# Patient Record
Sex: FEMALE | Race: WHITE | NOT HISPANIC OR LATINO | Employment: FULL TIME | URBAN - METROPOLITAN AREA
[De-identification: names, ages, dates, MRNs, and addresses within clinical notes are randomized per-mention and may not be internally consistent; named-entity substitution may affect disease eponyms.]

---

## 2017-05-15 ENCOUNTER — EMPLOYEE HEALTH (OUTPATIENT)
Dept: OCCUPATIONAL MEDICINE | Facility: CLINIC | Age: 22
End: 2017-05-15

## 2017-05-15 ENCOUNTER — EH NON-PROVIDER (OUTPATIENT)
Dept: OCCUPATIONAL MEDICINE | Facility: CLINIC | Age: 22
End: 2017-05-15

## 2017-05-15 ENCOUNTER — HOSPITAL ENCOUNTER (OUTPATIENT)
Facility: MEDICAL CENTER | Age: 22
End: 2017-05-15
Attending: PREVENTIVE MEDICINE
Payer: COMMERCIAL

## 2017-05-15 VITALS
OXYGEN SATURATION: 98 % | WEIGHT: 133 LBS | TEMPERATURE: 98.2 F | BODY MASS INDEX: 24.48 KG/M2 | RESPIRATION RATE: 16 BRPM | HEIGHT: 62 IN | SYSTOLIC BLOOD PRESSURE: 92 MMHG | DIASTOLIC BLOOD PRESSURE: 60 MMHG | HEART RATE: 62 BPM

## 2017-05-15 DIAGNOSIS — Z02.1 PRE-EMPLOYMENT HEALTH SCREENING EXAMINATION: ICD-10-CM

## 2017-05-15 DIAGNOSIS — Z02.1 PRE-EMPLOYMENT DRUG SCREENING: ICD-10-CM

## 2017-05-15 LAB
AMP AMPHETAMINE: NORMAL
BAR BARBITURATES: NORMAL
BZO BENZODIAZEPINES: NORMAL
COC COCAINE: NORMAL
INT CON NEG: NORMAL
INT CON POS: NORMAL
MDMA ECSTASY: NORMAL
MET METHAMPHETAMINES: NORMAL
MTD METHADONE: NORMAL
OPI OPIATES: NORMAL
OXY OXYCODONE: NORMAL
PCP PHENCYCLIDINE: NORMAL
POC URINE DRUG SCREEN OCDRS: NEGATIVE
THC: NORMAL

## 2017-05-15 PROCEDURE — 8915 PR COMPREHENSIVE PHYSICAL: Performed by: PREVENTIVE MEDICINE

## 2017-05-15 PROCEDURE — 86480 TB TEST CELL IMMUN MEASURE: CPT | Performed by: PREVENTIVE MEDICINE

## 2017-05-15 PROCEDURE — 94375 RESPIRATORY FLOW VOLUME LOOP: CPT | Performed by: PREVENTIVE MEDICINE

## 2017-05-15 PROCEDURE — 80305 DRUG TEST PRSMV DIR OPT OBS: CPT | Performed by: PREVENTIVE MEDICINE

## 2017-05-15 NOTE — PROGRESS NOTES
[employer] New Employee  1. Drug Screen COMPLETE  2. Immunizations  - Quantiferon  DRAW  - Varicella  immune  - MMR  doc  - Hep B  doc  - Tdap  doc   - Flu   doc  3. Mask Fit N95-S and CAPR  4. Physical Clearance complete

## 2017-05-15 NOTE — MR AVS SNAPSHOT
"Jenna Dover   5/15/2017 9:50 AM   Employee Health   MRN: [id number]    Department:  [facility]   Dept Phone:  [phone]    Description:  Female : 1995   Provider:  Otilio Machuca M.D.           Reason for Visit     Other rm 24 physical clearance      Allergies as of 5/15/2017     No Known Allergies      You were diagnosed with     Pre-employment health screening examination   [165522]         Vital Signs     Blood Pressure Pulse Temperature Respirations Height Weight    92/60 mmHg 62 36.8 °C (98.2 °F) 16 1.575 m (5' 2\") 60.328 kg (133 lb)    Body Mass Index Oxygen Saturation                24.32 kg/m2 98%          Basic Information     Date Of Birth Sex Race Ethnicity Preferred Language    1995 Female White Non- English      Health Maintenance     Patient has no pending health maintenance at this time      Current Immunizations     Hepatitis A Vaccine, Ped/Adol 2004    Hepatitis B Vaccine Non-Recombivax (Ped/Adol) 1996, 1995, 1995    MMR Vaccine 2000, 1996    Tdap Vaccine 2017    Varicella Vaccine Live 1996      Below and/or attached are the medications your provider expects you to take. Review all of your home medications and newly ordered medications with your provider and/or pharmacist. Follow medication instructions as directed by your provider and/or pharmacist. Please keep your medication list with you and share with your provider. Update the information when medications are discontinued, doses are changed, or new medications (including over-the-counter products) are added; and carry medication information at all times in the event of emergency situations     Allergies:  No Known Allergies          Medications  Valid as of: May 15, 2017 - 10:52 AM    Generic Name Brand Name Tablet Size Instructions for use    .                 Medicines prescribed today were sent to:     None      Medication refill instructions:       If your " prescription bottle indicates you have medication refills left, it is not necessary to call your provider’s office. Please contact your pharmacy and they will refill your medication.    If your prescription bottle indicates you do not have any refills left, you may request refills at any time through one of the following ways: The online [employer] system (except Urgent Care), by calling your provider’s office, or by asking your pharmacy to contact your provider’s office with a refill request. Medication refills are processed only during regular business hours and may not be available until the next business day. Your provider may request additional information or to have a follow-up visit with you prior to refilling your medication.   *Please Note: Medication refills are assigned a new Rx number when refilled electronically. Your pharmacy may indicate that no refills were authorized even though a new prescription for the same medication is available at the pharmacy. Please request the medicine by name with the pharmacy before contacting your provider for a refill.           [employer] Access Code: Activation code not generated  Current [employer] Status: Active

## 2017-05-15 NOTE — MR AVS SNAPSHOT
Jenna Dover   5/15/2017 9:10 AM    Non-Provider   MRN: [id number]    Department:  [facility]   Dept Phone:  [phone]    Description:  Female : 1995   Provider:  [facility], RYeisonN.           Reason for Visit     Other [facility] pre-employment clearance      Allergies as of 5/15/2017     No Known Allergies      You were diagnosed with     Pre-employment health screening examination   [948098]       Pre-employment drug screening   [762765]         Basic Information     Date Of Birth Sex Race Ethnicity Preferred Language    1995 Female White Non- English      Health Maintenance     Patient has no pending health maintenance at this time      Results     POCT 11 Panel Urine Drug Screen      Component    AMPHETAMINE    COCAINE    POC THC    METHAMPHETAMINES    OPIATES    PHENCYCLIDINE    BENZODIAZIPINES    BARBITURATES    METHADONE    MDMA Ecstasy    OXYCODONE    Urine Drug Screen    NEGATIVE    Internal Control Positive    Valid    Internal Control Negative    Valid                        Current Immunizations     Hepatitis A Vaccine, Ped/Adol 2004    Hepatitis B Vaccine Non-Recombivax (Ped/Adol) 1996, 1995, 1995    MMR Vaccine 2000, 1996    Tdap Vaccine 2017    Varicella Vaccine Live 1996      Below and/or attached are the medications your provider expects you to take. Review all of your home medications and newly ordered medications with your provider and/or pharmacist. Follow medication instructions as directed by your provider and/or pharmacist. Please keep your medication list with you and share with your provider. Update the information when medications are discontinued, doses are changed, or new medications (including over-the-counter products) are added; and carry medication information at all times in the event of emergency situations     Allergies:  No Known Allergies          Medications  Valid as of: May 15, 2017 - 10:31 AM    Generic Name Brand Name Tablet Size Instructions for use    .                 Medicines prescribed today were sent to:     None      Medication refill instructions:       If your prescription bottle indicates you have medication refills left, it is not necessary to call your provider’s office. Please contact your pharmacy and they will refill your medication.    If your prescription bottle indicates you do not have any refills left, you may request refills at any time through one of the following ways: The online [employer] system (except Urgent Care), by calling your provider’s office, or by asking your pharmacy to contact your provider’s office with a refill request. Medication refills are processed only during regular business hours and may not be available until the next business day. Your provider may request additional information or to have a follow-up visit with you prior to refilling your medication.   *Please Note: Medication refills are assigned a new Rx number when refilled electronically. Your pharmacy may indicate that no refills were authorized even though a new prescription for the same medication is available at the pharmacy. Please request the medicine by name with the pharmacy before contacting your provider for a refill.        Your To Do List     Future Labs/Procedures Complete By Expires    QuantiFERON-TB Gold [TB TEST CELL IMM MEASURE AG]  As directed 5/15/2018         [employer] Access Code: Activation code not generated  Current [employer] Status: Active

## 2017-05-17 LAB
M TB TUBERC IFN-G BLD QL: NEGATIVE
M TB TUBERC IFN-G/MITOGEN IGNF BLD: 0.14
M TB TUBERC IGNF/MITOGEN IGNF CONTROL: 66.62 [IU]/ML
MITOGEN IGNF BCKGRD COR BLD-ACNC: 0.06 [IU]/ML

## 2017-08-30 ENCOUNTER — HOSPITAL ENCOUNTER (OUTPATIENT)
Dept: LAB | Facility: MEDICAL CENTER | Age: 22
End: 2017-08-30
Attending: PHYSICIAN ASSISTANT
Payer: COMMERCIAL

## 2017-08-30 PROCEDURE — 87491 CHLMYD TRACH DNA AMP PROBE: CPT

## 2017-08-30 PROCEDURE — 88175 CYTOPATH C/V AUTO FLUID REDO: CPT

## 2017-08-30 PROCEDURE — 87591 N.GONORRHOEAE DNA AMP PROB: CPT

## 2017-09-02 LAB
C TRACH DNA GENITAL QL NAA+PROBE: NEGATIVE
CYTOLOGY REG CYTOL: NORMAL
N GONORRHOEA DNA GENITAL QL NAA+PROBE: NEGATIVE
SPECIMEN SOURCE: NORMAL

## 2017-09-12 ENCOUNTER — HOSPITAL ENCOUNTER (OUTPATIENT)
Facility: MEDICAL CENTER | Age: 22
End: 2017-09-12
Attending: PHYSICIAN ASSISTANT
Payer: COMMERCIAL

## 2017-09-12 ENCOUNTER — OFFICE VISIT (OUTPATIENT)
Dept: URGENT CARE | Facility: PHYSICIAN GROUP | Age: 22
End: 2017-09-12
Payer: COMMERCIAL

## 2017-09-12 VITALS
WEIGHT: 136.8 LBS | BODY MASS INDEX: 25.17 KG/M2 | SYSTOLIC BLOOD PRESSURE: 110 MMHG | TEMPERATURE: 98.2 F | HEIGHT: 62 IN | OXYGEN SATURATION: 98 % | DIASTOLIC BLOOD PRESSURE: 72 MMHG | HEART RATE: 74 BPM | RESPIRATION RATE: 16 BRPM

## 2017-09-12 DIAGNOSIS — N30.01 ACUTE CYSTITIS WITH HEMATURIA: ICD-10-CM

## 2017-09-12 DIAGNOSIS — R30.0 DYSURIA: ICD-10-CM

## 2017-09-12 DIAGNOSIS — R39.15 URINARY URGENCY: ICD-10-CM

## 2017-09-12 DIAGNOSIS — R35.0 URINARY FREQUENCY: ICD-10-CM

## 2017-09-12 LAB
APPEARANCE UR: NORMAL
BILIRUB UR STRIP-MCNC: NORMAL MG/DL
COLOR UR AUTO: YELLOW
GLUCOSE UR STRIP.AUTO-MCNC: NORMAL MG/DL
KETONES UR STRIP.AUTO-MCNC: NORMAL MG/DL
LEUKOCYTE ESTERASE UR QL STRIP.AUTO: NORMAL
NITRITE UR QL STRIP.AUTO: NORMAL
PH UR STRIP.AUTO: 6 [PH] (ref 5–8)
PROT UR QL STRIP: NORMAL MG/DL
RBC UR QL AUTO: NORMAL
SP GR UR STRIP.AUTO: 1
UROBILINOGEN UR STRIP-MCNC: NORMAL MG/DL

## 2017-09-12 PROCEDURE — 87077 CULTURE AEROBIC IDENTIFY: CPT

## 2017-09-12 PROCEDURE — 81002 URINALYSIS NONAUTO W/O SCOPE: CPT | Performed by: PHYSICIAN ASSISTANT

## 2017-09-12 PROCEDURE — 87186 SC STD MICRODIL/AGAR DIL: CPT

## 2017-09-12 PROCEDURE — 87086 URINE CULTURE/COLONY COUNT: CPT

## 2017-09-12 PROCEDURE — 99203 OFFICE O/P NEW LOW 30 MIN: CPT | Performed by: PHYSICIAN ASSISTANT

## 2017-09-12 RX ORDER — PHENAZOPYRIDINE HYDROCHLORIDE 200 MG/1
200 TABLET, FILM COATED ORAL 3 TIMES DAILY
Qty: 6 TAB | Refills: 0 | Status: SHIPPED | OUTPATIENT
Start: 2017-09-12 | End: 2017-09-14

## 2017-09-12 RX ORDER — NITROFURANTOIN 25; 75 MG/1; MG/1
100 CAPSULE ORAL EVERY 12 HOURS
Qty: 10 CAP | Refills: 0 | Status: SHIPPED | OUTPATIENT
Start: 2017-09-12 | End: 2017-09-17

## 2017-09-13 DIAGNOSIS — N30.01 ACUTE CYSTITIS WITH HEMATURIA: ICD-10-CM

## 2017-09-13 NOTE — PROGRESS NOTES
Chief Complaint   Patient presents with   • UTI     frequency, burning, odor, headaches       HISTORY OF PRESENT ILLNESS: Patient is a 22 y.o. female who presents today for the following:    Patient comes in for evaluation of acute onset dysuria. The symptoms started 2 days ago. She reports associated increased urinary frequency and urgency. She complains of suprapubic pressure. She denies flank pain, over abdominal pain, nausea, vomiting, and fever. She has not taken any of the counter medication but has been drinking a lot of water. This is her third UTI this year and has never had one before this year. She is sexually active and tries to urinate after intercourse. Her LMP was one week ago.    There are no active problems to display for this patient.      Allergies:Review of patient's allergies indicates no known allergies.    Current Outpatient Prescriptions Ordered in [facility]   Medication Sig Dispense Refill   • nitrofurantoin monohydr macro (MACROBID) 100 MG Cap Take 1 Cap by mouth every 12 hours for 5 days. 10 Cap 0   • phenazopyridine (PYRIDIUM) 200 MG Tab Take 1 Tab by mouth 3 times a day for 2 days. 6 Tab 0     No current Epic-ordered facility-administered medications on file.        No past medical history on file.    Social History   Substance Use Topics   • Smoking status: Never Smoker   • Smokeless tobacco: Never Used   • Alcohol use 0.6 oz/week     1 Shots of liquor per week       No family status information on file.   No family history on file.    ROS:   Review of Systems   Constitutional: Negative for fever, chills, weight loss and malaise/fatigue.   HENT: Negative for ear pain, nosebleeds, congestion, sore throat and neck pain.    Eyes: Negative for blurred vision.   Respiratory: Negative for cough, sputum production, shortness of breath and wheezing.    Cardiovascular: Negative for chest pain, palpitations, orthopnea and leg swelling.   Gastrointestinal: Negative for heartburn, nausea, vomiting and  "abdominal pain.   Genitourinary:Positive for dysuria, urgency and frequency.       Exam:  Blood pressure 110/72, pulse 74, temperature 36.8 °C (98.2 °F), resp. rate 16, height 1.575 m (5' 2\"), weight 62.1 kg (136 lb 12.8 oz), last menstrual period 09/05/2017, SpO2 98 %, not currently breastfeeding.  General: Well developed, well nourished. No distress.  HEENT: Head is grossly normal.  Pulmonary: Clear to ausculation and percussion.  Normal effort. No rales, ronchi, or wheezing.   Cardiovascular: Regular rate and rhythm without murmur. No edema.    Back: No CVA tenderness noted.  Abdomen: Soft, non-tender, nondistended. No hepatosplenomegaly.   Neurologic: Grossly nonfocal.  Skin: Warm, dry, good turgor. No rashes in visible areas.   Psych: Normal mood. Alert and oriented x3. Judgment and insight is normal.    UA: Large leukocytes, positive nitrates, hemolyzed blood, otherwise negative.    Assessment/Plan:  Drink plenty of fluids. Will contact patient culture results. Take all medication as directed.  1. Acute cystitis with hematuria  Urine Culture    nitrofurantoin monohydr macro (MACROBID) 100 MG Cap   2. Dysuria  POCT Urinalysis    phenazopyridine (PYRIDIUM) 200 MG Tab   3. Urinary frequency  POCT Urinalysis   4. Urinary urgency  POCT Urinalysis       "

## 2017-09-15 LAB
BACTERIA UR CULT: ABNORMAL
SIGNIFICANT IND 70042: ABNORMAL
SOURCE SOURCE: ABNORMAL

## 2017-09-18 ENCOUNTER — TELEPHONE (OUTPATIENT)
Dept: URGENT CARE | Facility: PHYSICIAN GROUP | Age: 22
End: 2017-09-18

## 2017-09-18 NOTE — TELEPHONE ENCOUNTER
----- Message from Zofia Coppola P.A.-C. sent at 9/15/2017 10:44 AM PDT -----  Please let pt know the urine culture is positive for infection. Patient was treated appropriately during the visit. Follow up for persistent symptoms.

## 2017-09-19 ENCOUNTER — TELEPHONE (OUTPATIENT)
Dept: URGENT CARE | Facility: PHYSICIAN GROUP | Age: 22
End: 2017-09-19

## 2017-09-19 NOTE — TELEPHONE ENCOUNTER
Phone Number Called: [phone] (home)     Message: left message for patient to call clinic for lab results    Left Message for patient to call back: yes

## 2017-09-21 ENCOUNTER — NON-PROVIDER VISIT (OUTPATIENT)
Dept: URGENT CARE | Facility: PHYSICIAN GROUP | Age: 22
End: 2017-09-21

## 2017-09-21 DIAGNOSIS — Z23 NEED FOR INFLUENZA VACCINATION: ICD-10-CM

## 2017-09-21 PROCEDURE — 90686 IIV4 VACC NO PRSV 0.5 ML IM: CPT | Performed by: PHYSICIAN ASSISTANT

## 2017-10-06 ENCOUNTER — OFFICE VISIT (OUTPATIENT)
Dept: URGENT CARE | Facility: PHYSICIAN GROUP | Age: 22
End: 2017-10-06
Payer: COMMERCIAL

## 2017-10-06 ENCOUNTER — HOSPITAL ENCOUNTER (OUTPATIENT)
Facility: MEDICAL CENTER | Age: 22
End: 2017-10-06
Attending: FAMILY MEDICINE
Payer: COMMERCIAL

## 2017-10-06 VITALS
BODY MASS INDEX: 24.92 KG/M2 | HEART RATE: 73 BPM | OXYGEN SATURATION: 99 % | RESPIRATION RATE: 14 BRPM | DIASTOLIC BLOOD PRESSURE: 72 MMHG | HEIGHT: 62 IN | WEIGHT: 135.4 LBS | SYSTOLIC BLOOD PRESSURE: 118 MMHG | TEMPERATURE: 97.8 F

## 2017-10-06 DIAGNOSIS — R53.83 OTHER FATIGUE: ICD-10-CM

## 2017-10-06 DIAGNOSIS — N89.8 VAGINAL DISCHARGE: ICD-10-CM

## 2017-10-06 LAB
ALBUMIN SERPL BCP-MCNC: 4.7 G/DL (ref 3.2–4.9)
ALBUMIN/GLOB SERPL: 1.5 G/DL
ALP SERPL-CCNC: 74 U/L (ref 30–99)
ALT SERPL-CCNC: 16 U/L (ref 2–50)
ANION GAP SERPL CALC-SCNC: 9 MMOL/L (ref 0–11.9)
APPEARANCE UR: CLEAR
AST SERPL-CCNC: 18 U/L (ref 12–45)
BASOPHILS # BLD AUTO: 0 % (ref 0–1.8)
BASOPHILS # BLD: 0 K/UL (ref 0–0.12)
BILIRUB SERPL-MCNC: 1.2 MG/DL (ref 0.1–1.5)
BILIRUB UR STRIP-MCNC: NORMAL MG/DL
BUN SERPL-MCNC: 8 MG/DL (ref 8–22)
CALCIUM SERPL-MCNC: 9.8 MG/DL (ref 8.5–10.5)
CHLORIDE SERPL-SCNC: 103 MMOL/L (ref 96–112)
CO2 SERPL-SCNC: 26 MMOL/L (ref 20–33)
COLOR UR AUTO: YELLOW
CREAT SERPL-MCNC: 0.7 MG/DL (ref 0.5–1.4)
EOSINOPHIL # BLD AUTO: 0.1 K/UL (ref 0–0.51)
EOSINOPHIL NFR BLD: 1.7 % (ref 0–6.9)
ERYTHROCYTE [DISTWIDTH] IN BLOOD BY AUTOMATED COUNT: 39.5 FL (ref 35.9–50)
GFR SERPL CREATININE-BSD FRML MDRD: >60 ML/MIN/1.73 M 2
GLOBULIN SER CALC-MCNC: 3.1 G/DL (ref 1.9–3.5)
GLUCOSE SERPL-MCNC: 86 MG/DL (ref 65–99)
GLUCOSE UR STRIP.AUTO-MCNC: NORMAL MG/DL
HCT VFR BLD AUTO: 40.1 % (ref 37–47)
HGB BLD-MCNC: 13.8 G/DL (ref 12–16)
INT CON NEG: NEGATIVE
INT CON POS: POSITIVE
KETONES UR STRIP.AUTO-MCNC: NORMAL MG/DL
LEUKOCYTE ESTERASE UR QL STRIP.AUTO: NORMAL
LYMPHOCYTES # BLD AUTO: 1.66 K/UL (ref 1–4.8)
LYMPHOCYTES NFR BLD: 28.1 % (ref 22–41)
MANUAL DIFF BLD: NORMAL
MCH RBC QN AUTO: 31.4 PG (ref 27–33)
MCHC RBC AUTO-ENTMCNC: 34.4 G/DL (ref 33.6–35)
MCV RBC AUTO: 91.3 FL (ref 81.4–97.8)
MONOCYTES # BLD AUTO: 0.47 K/UL (ref 0–0.85)
MONOCYTES NFR BLD AUTO: 7.9 % (ref 0–13.4)
MORPHOLOGY BLD-IMP: NORMAL
NEUTROPHILS # BLD AUTO: 3.68 K/UL (ref 2–7.15)
NEUTROPHILS NFR BLD: 62.3 % (ref 44–72)
NITRITE UR QL STRIP.AUTO: NORMAL
NRBC # BLD AUTO: 0 K/UL
NRBC BLD AUTO-RTO: 0 /100 WBC
PH UR STRIP.AUTO: 5 [PH] (ref 5–8)
PLATELET # BLD AUTO: 251 K/UL (ref 164–446)
PLATELET BLD QL SMEAR: NORMAL
PMV BLD AUTO: 11.1 FL (ref 9–12.9)
POC URINE PREGNANCY TEST: NEGATIVE
POTASSIUM SERPL-SCNC: 3.5 MMOL/L (ref 3.6–5.5)
PROT SERPL-MCNC: 7.8 G/DL (ref 6–8.2)
PROT UR QL STRIP: NORMAL MG/DL
RBC # BLD AUTO: 4.39 M/UL (ref 4.2–5.4)
RBC BLD AUTO: NORMAL
RBC UR QL AUTO: NORMAL
SODIUM SERPL-SCNC: 138 MMOL/L (ref 135–145)
SP GR UR STRIP.AUTO: 1.01
TSH SERPL DL<=0.005 MIU/L-ACNC: 1.17 UIU/ML (ref 0.3–3.7)
UROBILINOGEN UR STRIP-MCNC: NORMAL MG/DL
WBC # BLD AUTO: 5.9 K/UL (ref 4.8–10.8)

## 2017-10-06 PROCEDURE — 80053 COMPREHEN METABOLIC PANEL: CPT

## 2017-10-06 PROCEDURE — 99214 OFFICE O/P EST MOD 30 MIN: CPT | Performed by: FAMILY MEDICINE

## 2017-10-06 PROCEDURE — 81002 URINALYSIS NONAUTO W/O SCOPE: CPT | Performed by: FAMILY MEDICINE

## 2017-10-06 PROCEDURE — 87591 N.GONORRHOEAE DNA AMP PROB: CPT

## 2017-10-06 PROCEDURE — 85007 BL SMEAR W/DIFF WBC COUNT: CPT

## 2017-10-06 PROCEDURE — 81025 URINE PREGNANCY TEST: CPT | Performed by: FAMILY MEDICINE

## 2017-10-06 PROCEDURE — 87660 TRICHOMONAS VAGIN DIR PROBE: CPT

## 2017-10-06 PROCEDURE — 85027 COMPLETE CBC AUTOMATED: CPT

## 2017-10-06 PROCEDURE — 87480 CANDIDA DNA DIR PROBE: CPT

## 2017-10-06 PROCEDURE — 84443 ASSAY THYROID STIM HORMONE: CPT

## 2017-10-06 PROCEDURE — 87491 CHLMYD TRACH DNA AMP PROBE: CPT

## 2017-10-06 PROCEDURE — 87510 GARDNER VAG DNA DIR PROBE: CPT

## 2017-10-06 PROCEDURE — 87086 URINE CULTURE/COLONY COUNT: CPT

## 2017-10-06 RX ORDER — FLUCONAZOLE 150 MG/1
150 TABLET ORAL
Qty: 2 TAB | Refills: 0 | Status: SHIPPED | OUTPATIENT
Start: 2017-10-06 | End: 2017-10-09

## 2017-10-06 NOTE — PROGRESS NOTES
Subjective:      CC:  presents with Dysuria            Dysuria   This is a new problem.  She c/o dysuria and vaginal irritation.  The current episode started in the past 2 days. The problem occurs every urination. The problem has been unchanged. The quality of the pain is described as burning. There has been no fever. Pt is  sexually active. There is no history of pyelonephritis. Associated symptoms includes: vaginal discharge. Pertinent negatives include no chills,  , flank pain, nausea or vomiting. Pt has tried nothing for the symptoms. There is no history of recurrent UTIs.   Her partner has no STI's        Also c/o feeling fatigued and wanting to take naps during the day.   She has felt this way for over a year.   Denies depression    Social History     Social History   • Marital status: Single     Spouse name: N/A   • Number of children: N/A   • Years of education: N/A     Occupational History   • Not on file.     Social History Main Topics   • Smoking status: Never Smoker   • Smokeless tobacco: Never Used   • Alcohol use 0.6 oz/week     1 Shots of liquor per week   • Drug use: No   • Sexual activity: Yes     Partners: Male     Birth control/ protection: Condom     Other Topics Concern   • Not on file     Social History Narrative   • No narrative on file           No Known Allergies        No current outpatient prescriptions on file prior to visit.     No current facility-administered medications on file prior to visit.        Review of Systems   Constitutional: Negative for chills.   Respiratory: Negative for shortness of breath.    Cardiovascular: Negative for chest pain.   Gastrointestinal: Negative for nausea, vomiting and abdominal pain.   Genitourinary: Positive for dysuria .  Negative for flank pain.   Skin: Negative for rash.   Neurological: Negative for dizziness and headaches.   Psych - denies depression, anxiety, SI  All other systems reviewed and are negative.         Objective:      Blood pressure  "118/72, pulse 73, temperature 36.6 °C (97.8 °F), resp. rate 14, height 1.575 m (5' 2\"), weight 61.4 kg (135 lb 6.4 oz), SpO2 99 %.      Physical Exam   Constitutional: pt is oriented to person, place, and time. Pt appears well-developed and well-nourished. No distress.   HENT:   Head: Normocephalic and atraumatic.   Mouth/Throat: Mucous membranes are normal.   Eyes: Conjunctivae and EOM are normal. Pupils are equal, round, and reactive to light. Right eye exhibits no discharge. Left eye exhibits no discharge. No scleral icterus.   Neck: Normal range of motion. Neck supple.   Cardiovascular: Normal rate, regular rhythm, normal heart sounds and intact distal pulses.    No murmur heard.  Pulmonary/Chest: Effort normal and breath sounds normal. No respiratory distress. Pt has no wheezes,  rales.   Abdominal: Bowel sounds are normal. Pt exhibits no distension and no mass. There is no tenderness. There is no rebound, no guarding and no CVA tenderness.    - no rashes noted.   Pelvic exam was performed with patient supine.  Cervix exhibits no motion tenderness. No erythema in the vagina. No signs of injury around the vagina. Thick, white vaginal discharge found.   Neurological: pt is alert and oriented to person, place, and time.   Skin: Skin is warm and dry.   Psychiatric: behavior is normal.   Nursing note and vitals reviewed.                Assessment/Plan:           1. Vaginal discharge  Likely due to yeast infection  - fluconazole (DIFLUCAN) 150 MG tablet; Take 1 Tab by mouth every 48 hours for 2 doses.  Dispense: 2 Tab; Refill: 0  - VAGINAL PATHOGENS DNA PANEL; Future  - CHLAMYDIA/GC PCR URINE OR SWAB; Future  - POCT Urinalysis  - URINE CULTURE(NEW); Future  - POCT Pregnancy    2. Other fatigue     - CBC WITH DIFFERENTIAL; Future  - COMP METABOLIC PANEL; Future  - TSH; Future    "

## 2017-10-07 DIAGNOSIS — N89.8 VAGINAL DISCHARGE: ICD-10-CM

## 2017-10-07 LAB
C TRACH DNA SPEC QL NAA+PROBE: NEGATIVE
CANDIDA DNA VAG QL PROBE+SIG AMP: POSITIVE
G VAGINALIS DNA VAG QL PROBE+SIG AMP: NEGATIVE
N GONORRHOEA DNA SPEC QL NAA+PROBE: NEGATIVE
SPECIMEN SOURCE: NORMAL
T VAGINALIS DNA VAG QL PROBE+SIG AMP: NEGATIVE

## 2017-10-09 LAB
BACTERIA UR CULT: NORMAL
SIGNIFICANT IND 70042: NORMAL
SOURCE SOURCE: NORMAL

## 2018-01-12 ENCOUNTER — OFFICE VISIT (OUTPATIENT)
Dept: URGENT CARE | Facility: PHYSICIAN GROUP | Age: 23
End: 2018-01-12
Payer: COMMERCIAL

## 2018-01-12 VITALS
TEMPERATURE: 98.5 F | BODY MASS INDEX: 23 KG/M2 | SYSTOLIC BLOOD PRESSURE: 114 MMHG | DIASTOLIC BLOOD PRESSURE: 70 MMHG | HEART RATE: 99 BPM | HEIGHT: 62 IN | WEIGHT: 125 LBS | OXYGEN SATURATION: 99 %

## 2018-01-12 DIAGNOSIS — H66.001 ACUTE SUPPURATIVE OTITIS MEDIA OF RIGHT EAR WITHOUT SPONTANEOUS RUPTURE OF TYMPANIC MEMBRANE, RECURRENCE NOT SPECIFIED: ICD-10-CM

## 2018-01-12 PROCEDURE — 99214 OFFICE O/P EST MOD 30 MIN: CPT | Performed by: FAMILY MEDICINE

## 2018-01-12 RX ORDER — AMOXICILLIN 500 MG/1
500 CAPSULE ORAL 2 TIMES DAILY
Qty: 20 CAP | Refills: 0 | Status: SHIPPED | OUTPATIENT
Start: 2018-01-12 | End: 2018-01-22

## 2018-01-12 ASSESSMENT — PAIN SCALES - GENERAL: PAINLEVEL: NO PAIN

## 2018-01-14 ASSESSMENT — ENCOUNTER SYMPTOMS
HEADACHES: 0
SORE THROAT: 0

## 2018-01-14 NOTE — PROGRESS NOTES
"Subjective:   Jenna Dover is a 22 y.o. female who presents for Ear Pain (Ear Pain R x4 days)        Otalgia    There is pain in the right ear. This is a new problem. The current episode started in the past 7 days. The problem occurs constantly. The problem has been rapidly worsening. There has been no fever. The pain is severe. Pertinent negatives include no ear discharge, headaches, hearing loss or sore throat.     Review of Systems   HENT: Positive for ear pain. Negative for ear discharge, hearing loss and sore throat.    Neurological: Negative for headaches.     No Known Allergies   Objective:   /70   Pulse 99   Temp 36.9 °C (98.5 °F)   Ht 1.575 m (5' 2\")   Wt 56.7 kg (125 lb)   SpO2 99%   Breastfeeding? No   BMI 22.86 kg/m²   Physical Exam   Constitutional: She is oriented to person, place, and time. She appears well-developed and well-nourished. No distress.   HENT:   Head: Normocephalic and atraumatic.   Right Ear: There is tenderness. No drainage. A middle ear effusion is present.   Left Ear: Hearing, tympanic membrane and ear canal normal.   Eyes: Conjunctivae and EOM are normal. Pupils are equal, round, and reactive to light.   Cardiovascular: Normal rate, regular rhythm, normal heart sounds and intact distal pulses.    No murmur heard.  Pulmonary/Chest: Effort normal and breath sounds normal. No respiratory distress.   Abdominal: Soft. Bowel sounds are normal. She exhibits no distension. There is no tenderness.   Musculoskeletal: Normal range of motion.   Neurological: She is alert and oriented to person, place, and time. She has normal reflexes. No sensory deficit.   Skin: Skin is warm and dry.   Psychiatric: She has a normal mood and affect. Her behavior is normal.         Assessment/Plan:   Assessment    1. Acute suppurative otitis media of right ear without spontaneous rupture of tympanic membrane, recurrence not specified  - amoxicillin (AMOXIL) 500 MG Cap; Take 1 Cap by mouth 2 times a " day for 10 days.  Dispense: 20 Cap; Refill: 0  Differential diagnosis, natural history, supportive care, and indications for immediate follow-up discussed.

## 2018-02-22 ENCOUNTER — HOSPITAL ENCOUNTER (OUTPATIENT)
Dept: LAB | Facility: MEDICAL CENTER | Age: 23
End: 2018-02-22
Payer: COMMERCIAL

## 2018-02-22 ENCOUNTER — OFFICE VISIT (OUTPATIENT)
Dept: URGENT CARE | Facility: PHYSICIAN GROUP | Age: 23
End: 2018-02-22
Payer: COMMERCIAL

## 2018-02-22 VITALS
OXYGEN SATURATION: 99 % | TEMPERATURE: 98.7 F | WEIGHT: 126 LBS | RESPIRATION RATE: 14 BRPM | BODY MASS INDEX: 23.05 KG/M2 | DIASTOLIC BLOOD PRESSURE: 74 MMHG | HEART RATE: 93 BPM | SYSTOLIC BLOOD PRESSURE: 124 MMHG

## 2018-02-22 DIAGNOSIS — B37.31 VAGINAL YEAST INFECTION: Primary | ICD-10-CM

## 2018-02-22 DIAGNOSIS — R30.0 DYSURIA: ICD-10-CM

## 2018-02-22 LAB
APPEARANCE UR: NORMAL
BDY FAT % MEASURED: 23.2 %
BILIRUB UR STRIP-MCNC: NORMAL MG/DL
BP DIAS: 73 MMHG
BP SYS: 118 MMHG
CHOLEST SERPL-MCNC: 147 MG/DL (ref 100–199)
COLOR UR AUTO: NORMAL
DIABETES HTDIA: NO
EVENT NAME HTEVT: NORMAL
FASTING HTFAS: YES
GLUCOSE SERPL-MCNC: 92 MG/DL (ref 65–99)
GLUCOSE UR STRIP.AUTO-MCNC: NORMAL MG/DL
HDLC SERPL-MCNC: 45 MG/DL
HYPERTENSION HTHYP: NO
INT CON NEG: NEGATIVE
INT CON POS: POSITIVE
KETONES UR STRIP.AUTO-MCNC: NORMAL MG/DL
LDLC SERPL CALC-MCNC: 90 MG/DL
LEUKOCYTE ESTERASE UR QL STRIP.AUTO: NORMAL
NITRITE UR QL STRIP.AUTO: NORMAL
PH UR STRIP.AUTO: 5 [PH] (ref 5–8)
POC URINE PREGNANCY TEST: NORMAL
PROT UR QL STRIP: 30 MG/DL
RBC UR QL AUTO: NORMAL
SCREENING LOC CITY HTCIT: NORMAL
SCREENING LOC STATE HTSTA: NORMAL
SCREENING LOCATION HTLOC: NORMAL
SMOKING HTSMO: NO
SP GR UR STRIP.AUTO: 1.02
SUBSCRIBER ID HTSID: NORMAL
TRIGL SERPL-MCNC: 60 MG/DL (ref 0–149)
UROBILINOGEN UR STRIP-MCNC: NORMAL MG/DL

## 2018-02-22 PROCEDURE — 36415 COLL VENOUS BLD VENIPUNCTURE: CPT

## 2018-02-22 PROCEDURE — 80061 LIPID PANEL: CPT

## 2018-02-22 PROCEDURE — 81002 URINALYSIS NONAUTO W/O SCOPE: CPT | Performed by: PHYSICIAN ASSISTANT

## 2018-02-22 PROCEDURE — 99214 OFFICE O/P EST MOD 30 MIN: CPT | Performed by: PHYSICIAN ASSISTANT

## 2018-02-22 PROCEDURE — S5190 WELLNESS ASSESSMENT BY NONPH: HCPCS

## 2018-02-22 PROCEDURE — 81025 URINE PREGNANCY TEST: CPT | Performed by: PHYSICIAN ASSISTANT

## 2018-02-22 PROCEDURE — 82947 ASSAY GLUCOSE BLOOD QUANT: CPT

## 2018-02-22 RX ORDER — FLUCONAZOLE 150 MG/1
150 TABLET ORAL
Qty: 3 TAB | Refills: 0 | Status: SHIPPED | OUTPATIENT
Start: 2018-02-22 | End: 2019-11-20

## 2018-02-22 ASSESSMENT — ENCOUNTER SYMPTOMS
VAGINITIS: 1
DIARRHEA: 0
FLANK PAIN: 0
ABDOMINAL PAIN: 0
FEVER: 0

## 2018-02-22 NOTE — PROGRESS NOTES
Subjective:      Jenna Dover is a 22 y.o. female who presents with Vaginal Discharge ( some pain after sex x12 days)    Pt PMH, SocHx, SurgHx, FamHx, Drug allergies and medications reviewed with [facility].      Family history reviewed, it is not pertinent to this complaint.           PT co vaginal itching, white discharge and dysuria for the last week. PT has been on 2 separate antibiotic treatments in the past one month, for and ear infection then 2 weeks later of dental infection after wisdom teeth extraction.  Pt believes it is a yeast infection, tried OTC monistat which helped but did not resolve the issue.  No other complaints at this time.       Vaginitis   The patient's primary symptoms include genital itching and vaginal discharge. The patient's pertinent negatives include no genital lesions, genital odor, genital rash, missed menses or vaginal bleeding. This is a new problem. The current episode started in the past 7 days. The problem occurs constantly. The problem has been unchanged. The pain is mild. The problem affects both sides. She is not pregnant. Associated symptoms include dysuria and painful intercourse. Pertinent negatives include no abdominal pain, diarrhea, fever, flank pain, frequency, hematuria, rash or urgency. The vaginal discharge was thick and white. There has been no bleeding. The symptoms are aggravated by tactile pressure and intercourse. She has tried antifungals for the symptoms. The treatment provided mild relief. She is sexually active. No, her partner does not have an STD. Her menstrual history has been regular. Her past medical history is significant for vaginosis. There is no history of ovarian cysts, PID or an STD.       Review of Systems   Constitutional: Negative for fever.   Gastrointestinal: Negative for abdominal pain and diarrhea.   Genitourinary: Positive for dysuria and vaginal discharge. Negative for flank pain, frequency, hematuria, missed menses and urgency.   Skin:  Negative for rash.   All other systems reviewed and are negative.         Objective:     /74   Pulse 93   Temp 37.1 °C (98.7 °F)   Resp 14   Wt 57.2 kg (126 lb)   SpO2 99%   BMI 23.05 kg/m²      Physical Exam   Constitutional: She is oriented to person, place, and time. She appears well-developed and well-nourished. No distress.   HENT:   Head: Normocephalic.   Mouth/Throat: Oropharynx is clear and moist.   Eyes: EOM are normal. Pupils are equal, round, and reactive to light.   Neck: Normal range of motion. Neck supple.   Cardiovascular: Normal rate.    Pulmonary/Chest: Effort normal and breath sounds normal.   Abdominal: Soft. Bowel sounds are normal. There is no tenderness. There is no rebound and no guarding.   Genitourinary:   Genitourinary Comments: Declined     Musculoskeletal: Normal range of motion.   Neurological: She is alert and oriented to person, place, and time.   Skin: Skin is warm and dry.   Psychiatric: She has a normal mood and affect.   Nursing note and vitals reviewed.         UA: neg LE, nitrites  Preg: neg     Assessment/Plan:     1. Vaginal yeast infection  fluconazole (DIFLUCAN) 150 MG tablet   2. Dysuria  POCT Urinalysis    POCT PREGNANCY    fluconazole (DIFLUCAN) 150 MG tablet     PT can add probiotics and/or yogurt daily to help restore vaginal balance.      PT should follow up with PCP in 1-2 days for re-evaluation if symptoms have not improved.  Discussed red flags and reasons to return to UC or ED.  Pt and/or family verbalized understanding of diagnosis and follow up instructions and was offered informational handout on diagnosis.  PT discharged.

## 2018-03-16 ENCOUNTER — HOSPITAL ENCOUNTER (OUTPATIENT)
Facility: MEDICAL CENTER | Age: 23
End: 2018-03-16
Attending: PREVENTIVE MEDICINE
Payer: COMMERCIAL

## 2018-03-16 ENCOUNTER — EH NON-PROVIDER (OUTPATIENT)
Dept: OCCUPATIONAL MEDICINE | Facility: CLINIC | Age: 23
End: 2018-03-16

## 2018-03-16 DIAGNOSIS — Z02.89 ENCOUNTER FOR OCCUPATIONAL HEALTH EXAMINATION: ICD-10-CM

## 2018-03-16 PROCEDURE — 94375 RESPIRATORY FLOW VOLUME LOOP: CPT | Performed by: PREVENTIVE MEDICINE

## 2018-03-16 PROCEDURE — 86480 TB TEST CELL IMMUN MEASURE: CPT | Performed by: PREVENTIVE MEDICINE

## 2018-03-16 PROCEDURE — 86480 TB TEST CELL IMMUN MEASURE: CPT

## 2018-03-17 LAB
M TB TUBERC IFN-G BLD QL: NEGATIVE
M TB TUBERC IFN-G/MITOGEN IGNF BLD: 0.21
M TB TUBERC IGNF/MITOGEN IGNF CONTROL: 55.47 [IU]/ML
MITOGEN IGNF BCKGRD COR BLD-ACNC: 0.28 [IU]/ML

## 2018-03-19 ENCOUNTER — EH NON-PROVIDER (OUTPATIENT)
Dept: OCCUPATIONAL MEDICINE | Facility: CLINIC | Age: 23
End: 2018-03-19

## 2018-03-19 DIAGNOSIS — Z01.89 RESPIRATORY CLEARANCE EXAMINATION, ENCOUNTER FOR: ICD-10-CM

## 2018-03-19 PROCEDURE — 8916 PR CLEARANCE ONLY: Performed by: PREVENTIVE MEDICINE

## 2018-08-20 ENCOUNTER — DOCUMENTATION (OUTPATIENT)
Dept: OCCUPATIONAL MEDICINE | Facility: CLINIC | Age: 23
End: 2018-08-20

## 2018-09-18 ENCOUNTER — EH NON-PROVIDER (OUTPATIENT)
Dept: OCCUPATIONAL MEDICINE | Facility: CLINIC | Age: 23
End: 2018-09-18

## 2018-09-18 DIAGNOSIS — Z02.89 ENCOUNTER FOR OCCUPATIONAL HEALTH EXAMINATION INVOLVING RESPIRATOR: Primary | ICD-10-CM

## 2018-09-18 PROCEDURE — 94375 RESPIRATORY FLOW VOLUME LOOP: CPT | Performed by: PREVENTIVE MEDICINE

## 2018-09-25 ENCOUNTER — IMMUNIZATION (OUTPATIENT)
Dept: OCCUPATIONAL MEDICINE | Facility: CLINIC | Age: 23
End: 2018-09-25

## 2018-09-25 DIAGNOSIS — Z23 NEED FOR VACCINATION: ICD-10-CM

## 2018-09-25 PROCEDURE — 90686 IIV4 VACC NO PRSV 0.5 ML IM: CPT | Performed by: PREVENTIVE MEDICINE

## 2018-10-02 ENCOUNTER — HOSPITAL ENCOUNTER (OUTPATIENT)
Dept: LAB | Facility: MEDICAL CENTER | Age: 23
End: 2018-10-02
Attending: PHYSICIAN ASSISTANT
Payer: COMMERCIAL

## 2018-10-02 PROCEDURE — 88175 CYTOPATH C/V AUTO FLUID REDO: CPT

## 2018-10-02 PROCEDURE — 87491 CHLMYD TRACH DNA AMP PROBE: CPT

## 2018-10-02 PROCEDURE — 87591 N.GONORRHOEAE DNA AMP PROB: CPT

## 2018-10-05 ENCOUNTER — HOSPITAL ENCOUNTER (OUTPATIENT)
Facility: MEDICAL CENTER | Age: 23
End: 2018-10-05
Attending: PREVENTIVE MEDICINE
Payer: COMMERCIAL

## 2018-10-06 LAB
M TB TUBERC IFN-G BLD QL: POSITIVE
M TB TUBERC IFN-G/MITOGEN IGNF BLD: 0.43
M TB TUBERC IGNF/MITOGEN IGNF CONTROL: 54.63 [IU]/ML
MITOGEN IGNF BCKGRD COR BLD-ACNC: 0.22 [IU]/ML

## 2019-06-28 ENCOUNTER — HOSPITAL ENCOUNTER (OUTPATIENT)
Facility: MEDICAL CENTER | Age: 24
End: 2019-06-28
Payer: COMMERCIAL

## 2019-06-28 LAB
BDY FAT % MEASURED: 22.2 %
BP DIAS: 72 MMHG
BP SYS: 100 MMHG
CHOLEST SERPL-MCNC: 144 MG/DL (ref 100–199)
DIABETES HTDIA: NO
EVENT NAME HTEVT: NORMAL
FASTING HTFAS: YES
GLUCOSE SERPL-MCNC: 88 MG/DL (ref 65–99)
HDLC SERPL-MCNC: 45 MG/DL
HYPERTENSION HTHYP: NO
LDLC SERPL CALC-MCNC: 89 MG/DL
SCREENING LOC CITY HTCIT: NORMAL
SCREENING LOC STATE HTSTA: NORMAL
SCREENING LOCATION HTLOC: NORMAL
SMOKING HTSMO: NO
SUBSCRIBER ID HTSID: NORMAL
TRIGL SERPL-MCNC: 52 MG/DL (ref 0–149)

## 2019-06-28 PROCEDURE — 82947 ASSAY GLUCOSE BLOOD QUANT: CPT

## 2019-06-28 PROCEDURE — 80061 LIPID PANEL: CPT

## 2019-06-28 PROCEDURE — S5190 WELLNESS ASSESSMENT BY NONPH: HCPCS

## 2019-06-28 PROCEDURE — 36415 COLL VENOUS BLD VENIPUNCTURE: CPT

## 2019-09-17 ENCOUNTER — HOSPITAL ENCOUNTER (OUTPATIENT)
Facility: MEDICAL CENTER | Age: 24
End: 2019-09-17
Attending: PREVENTIVE MEDICINE
Payer: COMMERCIAL

## 2019-09-17 ENCOUNTER — EH NON-PROVIDER (OUTPATIENT)
Dept: OCCUPATIONAL MEDICINE | Facility: CLINIC | Age: 24
End: 2019-09-17

## 2019-09-17 ENCOUNTER — TELEPHONE (OUTPATIENT)
Dept: SCHEDULING | Facility: IMAGING CENTER | Age: 24
End: 2019-09-17

## 2019-09-17 PROCEDURE — 86480 TB TEST CELL IMMUN MEASURE: CPT | Performed by: PREVENTIVE MEDICINE

## 2019-09-20 LAB
GAMMA INTERFERON BACKGROUND BLD IA-ACNC: 0.7 IU/ML
M TB IFN-G BLD-IMP: NEGATIVE
M TB IFN-G CD4+ BCKGRND COR BLD-ACNC: -0.1 IU/ML
MITOGEN IGNF BCKGRD COR BLD-ACNC: >10 IU/ML
QFT TB2 - NIL TBQ2: -0.18 IU/ML

## 2019-09-21 ENCOUNTER — NON-PROVIDER VISIT (OUTPATIENT)
Dept: URGENT CARE | Facility: PHYSICIAN GROUP | Age: 24
End: 2019-09-21
Payer: COMMERCIAL

## 2019-09-21 DIAGNOSIS — Z23 NEED FOR VACCINATION: ICD-10-CM

## 2019-09-21 PROCEDURE — 90686 IIV4 VACC NO PRSV 0.5 ML IM: CPT | Performed by: FAMILY MEDICINE

## 2019-09-21 PROCEDURE — 90471 IMMUNIZATION ADMIN: CPT | Performed by: FAMILY MEDICINE

## 2019-10-21 ENCOUNTER — HOSPITAL ENCOUNTER (OUTPATIENT)
Dept: LAB | Facility: MEDICAL CENTER | Age: 24
End: 2019-10-21
Attending: PHYSICIAN ASSISTANT
Payer: COMMERCIAL

## 2019-10-21 PROCEDURE — 88175 CYTOPATH C/V AUTO FLUID REDO: CPT

## 2019-10-21 PROCEDURE — 87591 N.GONORRHOEAE DNA AMP PROB: CPT

## 2019-10-21 PROCEDURE — 87491 CHLMYD TRACH DNA AMP PROBE: CPT

## 2019-10-22 ENCOUNTER — EH NON-PROVIDER (OUTPATIENT)
Dept: OCCUPATIONAL MEDICINE | Facility: CLINIC | Age: 24
End: 2019-10-22

## 2019-10-22 DIAGNOSIS — Z02.89 ENCOUNTER FOR OCCUPATIONAL HEALTH EXAMINATION INVOLVING RESPIRATOR: ICD-10-CM

## 2019-10-22 PROCEDURE — 94375 RESPIRATORY FLOW VOLUME LOOP: CPT | Performed by: NURSE PRACTITIONER

## 2019-10-30 ENCOUNTER — TELEPHONE (OUTPATIENT)
Dept: SCHEDULING | Facility: IMAGING CENTER | Age: 24
End: 2019-10-30

## 2019-11-05 ENCOUNTER — OFFICE VISIT (OUTPATIENT)
Dept: MEDICAL GROUP | Facility: CLINIC | Age: 24
End: 2019-11-05
Payer: COMMERCIAL

## 2019-11-05 VITALS
BODY MASS INDEX: 23.19 KG/M2 | DIASTOLIC BLOOD PRESSURE: 78 MMHG | OXYGEN SATURATION: 99 % | TEMPERATURE: 99.5 F | SYSTOLIC BLOOD PRESSURE: 118 MMHG | HEART RATE: 84 BPM | WEIGHT: 126 LBS | RESPIRATION RATE: 16 BRPM | HEIGHT: 62 IN

## 2019-11-05 DIAGNOSIS — Z23 NEED FOR VACCINATION: ICD-10-CM

## 2019-11-05 DIAGNOSIS — Z00.00 ENCOUNTER FOR MEDICAL EXAMINATION TO ESTABLISH CARE: ICD-10-CM

## 2019-11-05 DIAGNOSIS — R51.9 NONINTRACTABLE EPISODIC HEADACHE, UNSPECIFIED HEADACHE TYPE: ICD-10-CM

## 2019-11-05 DIAGNOSIS — F33.1 MODERATE EPISODE OF RECURRENT MAJOR DEPRESSIVE DISORDER (HCC): ICD-10-CM

## 2019-11-05 PROCEDURE — 90471 IMMUNIZATION ADMIN: CPT | Performed by: PHYSICIAN ASSISTANT

## 2019-11-05 PROCEDURE — 99203 OFFICE O/P NEW LOW 30 MIN: CPT | Mod: 25 | Performed by: PHYSICIAN ASSISTANT

## 2019-11-05 PROCEDURE — 90651 9VHPV VACCINE 2/3 DOSE IM: CPT | Performed by: PHYSICIAN ASSISTANT

## 2019-11-05 SDOH — HEALTH STABILITY: MENTAL HEALTH
STRESS IS WHEN SOMEONE FEELS TENSE, NERVOUS, ANXIOUS, OR CAN'T SLEEP AT NIGHT BECAUSE THEIR MIND IS TROUBLED. HOW STRESSED ARE YOU?: ONLY A LITTLE

## 2019-11-05 SDOH — HEALTH STABILITY: MENTAL HEALTH: HOW OFTEN DO YOU HAVE A DRINK CONTAINING ALCOHOL?: MONTHLY OR LESS

## 2019-11-05 ASSESSMENT — PATIENT HEALTH QUESTIONNAIRE - PHQ9
5. POOR APPETITE OR OVEREATING: 1 - SEVERAL DAYS
CLINICAL INTERPRETATION OF PHQ2 SCORE: 2
SUM OF ALL RESPONSES TO PHQ QUESTIONS 1-9: 8

## 2019-11-05 ASSESSMENT — PAIN SCALES - GENERAL: PAINLEVEL: NO PAIN

## 2019-11-05 NOTE — ASSESSMENT & PLAN NOTE
"This is a chronic condition, new to this provider and has never been clinically assessed. The patient reports episodes of anxiety and depression over the last 5 years.  Symptoms began after she moved away from home which caused problems with her family. She was also in a \"bad\" relationship.  She has since mended the relationship with her parents and has ended the bad relationship but still at times feels guilt. She often times has feelings of no purpose in life but has never had any thoughts or desire to end her life. She alexandr with her depressive episodes by sleeping. Patient also has periodic episodes of anxiety that she is able to control on her own with intention and mindfulness alone.  They usually occur at night before she falls asleep she feels trapped and suffocated but is relieved by removing clothes and covers and holding herself.  "

## 2019-11-05 NOTE — PROGRESS NOTES
"Chief Complaint   Patient presents with   • Anxiety       HISTORY OF THE PRESENT ILLNESS: This is a 24 y.o. female new patient to our practice who presents today for evaluation and management of:    Moderate episode of recurrent major depressive disorder (HCC)  This is a chronic condition, new to this provider and has never been clinically assessed. The patient reports episodes of anxiety and depression over the last 5 years.  Symptoms began after she moved away from home which caused problems with her family. She was also in a \"bad\" relationship.  She has since mended the relationship with her parents and has ended the bad relationship but still at times feels guilt. She often times has feelings of no purpose in life but has never had any thoughts or desire to end her life. She alexandr with her depressive episodes by sleeping. Patient also has periodic episodes of anxiety that she is able to control on her own with intention and mindfulness alone.  They usually occur at night before she falls asleep she feels trapped and suffocated but is relieved by removing clothes and covers and holding herself.    Nonintractable episodic headache  Patient reports headaches that sometimes keep her from being able to visually focus.  Her headaches often vary in location and are not debilitating.  They usually occur across her forehead, across the base of her skull and are never unilateral.    Encounter for medical examination to establish care  Patient wishes to establish care with a primary care provider.      History reviewed. No pertinent past medical history.    Past Surgical History:   Procedure Laterality Date   • OTHER      wisdom teeth       Family Status   Relation Name Status   • Mo  Alive   • Fa  Alive   • Sis  Alive   • Bro  Alive   • MGMo     • PGFa  Alive   • Sis  Alive   • Sis  Alive   • Bro  Alive   • Bro  Alive   • PAunt  Alive     Family History   Problem Relation Age of Onset   • Hypertension Father    • " "Migraines Sister    • Arterial Aneurysm Maternal Grandmother 45        brain   • Cancer Paternal Grandfather         lung   • Bipolar disorder Paternal Aunt        Social History     Tobacco Use   • Smoking status: Never Smoker   • Smokeless tobacco: Never Used   Substance Use Topics   • Alcohol use: Yes     Alcohol/week: 0.6 oz     Types: 1 Shots of liquor per week     Frequency: Monthly or less   • Drug use: No       Allergies: Patient has no known allergies.    Current Outpatient Medications Ordered in Epic   Medication Sig Dispense Refill   • fluconazole (DIFLUCAN) 150 MG tablet Take 1 Tab by mouth every 7 days. (Patient not taking: Reported on 11/5/2019) 3 Tab 0     No current Epic-ordered facility-administered medications on file.      Review of Systems: See HPI above.   Constitutional: Negative for fever, chills, unplanned weight change and malaise/fatigue.   HENT: Negative for ear pain or tinnitus, congestion, sore throat and neck pain.    Eyes: positive for decreased vision with headaches, otherwise negative for decreased vision.   Respiratory: Negative for cough, sputum production, shortness of breath and wheezing.    Cardiovascular: Negative for chest pain, palpitations.   Gastrointestinal: Negative for heartburn, nausea, vomiting and abdominal pain.   Genitourinary: Negative for dysuria, urgency and frequency.   Skin: Negative for skin changes.   Neurological: Negative for dizziness, and positive headaches.   Psychiatric/Behavioral: Positive for depression, negative for suicidal ideas and memory loss. The patient is nervous/anxious and does not have insomnia.  Pt does not use recreational drugs or excessive alcohol.       Exam:  /78 (BP Location: Right arm, Patient Position: Sitting, BP Cuff Size: Adult)   Pulse 84   Temp 37.5 °C (99.5 °F) (Temporal)   Resp 16   Ht 1.575 m (5' 2\")   Wt 57.2 kg (126 lb)   SpO2 99%   Body mass index is 23.05 kg/m².  General:  Healthy-Appearing female in " NAD  Eyes: Conjunctiva clear, lids without ptosis, pupils equal and reactive to light accommodation.  ENMT: Nose and lips without deformity. Nasal mucosa and septum pink without evidence of purulent drainage.  Neck: Supple without masses upon palpation. Thyroid is not visibly enlarged.  Pulmonary: Normal effort. No rales, ronchi, or wheezing upon auscultation.   Cardiovascular: Regular rate and rhythm without murmur.    Extremities: No clubbing or cyanosis. Bilateral upper and lower extremities without edema.   GI: Normoactive bowel sounds in all 4 quadrants. Soft, nontender, nondistended. Without palpable hepatosplenomegaly.   Neurologic: Deep tendon reflexes 2+/4 bilaterally.   Skin: Warm and dry.  No obvious lesions.  Musculoskeletal: Normal gait.   Psych: Normal mood and affect. Alert and oriented x3. Judgment and insight is normal.      Medical Decision Making & Discussion:   1. Moderate episode of recurrent major depressive disorder (HCC)  Patient would like a referral to behavior health to help with her depression and anxiety. She does not wish to take any mediation at this time.   - REFERRAL TO BEHAVIORAL HEALTH    2. Need for vaccination    - Gardasil 9    3. Encounter for medical examination to establish care  I am happy to participate in the care of this pleasant 24 year old Woman.       4. Nonintractable episodic headache, unspecified headache type  Patient is not currently concerned about her headaches at this time. If she becomes concerned she will return for further evaluation.    Jaylin BERNARD student preformed the history and physical under supervision of Doris LORA    Please note that this dictation was created using voice recognition software. I have made every reasonable attempt to correct obvious errors, but I expect that there are errors of grammar and possibly content that I did not discover before finalizing the note.    Return if symptoms worsen or fail to improve.

## 2019-11-05 NOTE — ASSESSMENT & PLAN NOTE
Patient reports headaches that sometimes keep her from being able to visually focus.  Her headaches often vary in location and are not debilitating.  They usually occur across her forehead, across the base of her skull and are never unilateral.

## 2019-11-05 NOTE — NON-PROVIDER
Twice a month, in bed about to fall asleep, feels trapped and suffocated, relieved by removing clothes/covers and holding her self    Center of attention-takes time but goes away on its own.     Not crippling, has been going on for about 5 years.     Referral for counseling

## 2019-11-20 ENCOUNTER — OFFICE VISIT (OUTPATIENT)
Dept: URGENT CARE | Facility: PHYSICIAN GROUP | Age: 24
End: 2019-11-20
Payer: COMMERCIAL

## 2019-11-20 VITALS
HEIGHT: 62 IN | DIASTOLIC BLOOD PRESSURE: 68 MMHG | RESPIRATION RATE: 16 BRPM | BODY MASS INDEX: 23.89 KG/M2 | SYSTOLIC BLOOD PRESSURE: 108 MMHG | TEMPERATURE: 98.9 F | HEART RATE: 106 BPM | OXYGEN SATURATION: 98 % | WEIGHT: 129.8 LBS

## 2019-11-20 DIAGNOSIS — R68.89 FLU-LIKE SYMPTOMS: ICD-10-CM

## 2019-11-20 DIAGNOSIS — J10.1 INFLUENZA B: ICD-10-CM

## 2019-11-20 LAB
FLUAV+FLUBV AG SPEC QL IA: POSITIVE
INT CON NEG: NEGATIVE
INT CON POS: POSITIVE

## 2019-11-20 PROCEDURE — 87804 INFLUENZA ASSAY W/OPTIC: CPT | Performed by: FAMILY MEDICINE

## 2019-11-20 PROCEDURE — 99214 OFFICE O/P EST MOD 30 MIN: CPT | Performed by: FAMILY MEDICINE

## 2019-11-20 RX ORDER — OSELTAMIVIR PHOSPHATE 75 MG/1
CAPSULE ORAL
Qty: 10 CAP | Refills: 0 | Status: ON HOLD | OUTPATIENT
Start: 2019-11-20 | End: 2021-07-25

## 2019-11-20 NOTE — PROGRESS NOTES
Chief Complaint:    Chief Complaint   Patient presents with   • Cough     x 3 days   • Generalized Body Aches   • Fever   • Chills       History of Present Illness:    This is a new problem. She started with fever up to 101 F and widespread body aches on 11/18/19 PM. She has had nasal symptoms with mild purulent mucus from nose, sore throat, and cough productive of mild purulent mucus. No vomiting or diarrhea. Using OTC meds for symptoms with some temporary help.      Review of Systems:    Constitutional: See HPI.   Eyes: Negative for change in vision, photophobia, pain, redness, and discharge.  ENT: See HPI.    Respiratory: See HPI.   Cardiovascular: Negative for chest pain, palpitations, orthopnea, claudication, leg swelling, and PND.   Gastrointestinal: Negative for abdominal pain, nausea, vomiting, diarrhea, constipation, blood in stool, and melena.   Genitourinary: Negative for dysuria, urinary urgency, urinary frequency, hematuria, and flank pain.   Musculoskeletal: See HPI.   Neurological: Negative for dizziness, tingling, tremors, sensory change, speech change, focal weakness, seizures, loss of consciousness, and headaches.   Endo: Negative for polydipsia.   Heme: Does not bruise/bleed easily.   Psychiatric/Behavioral: Negative for depression, suicidal ideas, hallucinations, memory loss and substance abuse. The patient is not nervous/anxious and does not have insomnia.        Past Medical History:    History reviewed. No pertinent past medical history.    Past Surgical History:    Past Surgical History:   Procedure Laterality Date   • OTHER      wisdom teeth     Social History:    Social History     Socioeconomic History   • Marital status:      Spouse name: Not on file   • Number of children: Not on file   • Years of education: Not on file   • Highest education level: Not on file   Occupational History   • Not on file   Social Needs   • Financial resource strain: Not on file   • Food insecurity:      "Worry: Not on file     Inability: Not on file   • Transportation needs:     Medical: Not on file     Non-medical: Not on file   Tobacco Use   • Smoking status: Never Smoker   • Smokeless tobacco: Never Used   Substance and Sexual Activity   • Alcohol use: Yes     Alcohol/week: 0.6 oz     Types: 1 Shots of liquor per week     Frequency: Monthly or less   • Drug use: No   • Sexual activity: Yes     Partners: Male     Birth control/protection: Condom, I.U.D.   Lifestyle   • Physical activity:     Days per week: Not on file     Minutes per session: Not on file   • Stress: Only a little   Relationships   • Social connections:     Talks on phone: Not on file     Gets together: Not on file     Attends Anabaptism service: Not on file     Active member of club or organization: Not on file     Attends meetings of clubs or organizations: Not on file     Relationship status: Not on file   • Intimate partner violence:     Fear of current or ex partner: Not on file     Emotionally abused: Not on file     Physically abused: Not on file     Forced sexual activity: Not on file   Other Topics Concern   • Not on file   Social History Narrative   • Not on file     Family History:    Family History   Problem Relation Age of Onset   • Hypertension Father    • Migraines Sister    • Arterial Aneurysm Maternal Grandmother 45        brain   • Cancer Paternal Grandfather         lung   • Bipolar disorder Paternal Aunt      Medications:    No current outpatient medications on file prior to visit.     No current facility-administered medications on file prior to visit.      Allergies:    No Known Allergies      Vitals:    Vitals:    11/20/19 1030   BP: 108/68   Pulse: (!) 106   Resp: 16   Temp: 37.2 °C (98.9 °F)   TempSrc: Temporal   SpO2: 98%   Weight: 58.9 kg (129 lb 12.8 oz)   Height: 1.575 m (5' 2\")       Physical Exam:    Constitutional: Vital signs reviewed. Appears well-developed and well-nourished. Occl cough. Fatigued. No acute " distress.   Eyes: Sclera white, conjunctivae clear.   ENT: External ears normal. External auditory canals normal without discharge. TMs translucent and non-bulging. Hearing normal. Nasal mucosa pink. Lips/teeth are normal. Oral mucosa pink and moist. Posterior pharynx: mild irritated appearance.  Neck: Neck supple.   Cardiovascular: Tachycardic. Regular rhythm. No murmur.  Pulmonary/Chest: Respirations non-labored. Clear to auscultation bilaterally.  Lymph: Cervical nodes without tenderness or enlargement.  Musculoskeletal: Normal gait. Normal range of motion. No muscular atrophy or weakness.  Neurological: Alert and oriented to person, place, and time. Muscle tone normal. Coordination normal.   Skin: No rashes or lesions. Warm, dry, normal turgor.  Psychiatric: Normal mood and affect. Behavior is normal. Judgment and thought content normal.       Diagnostics:    POCT Influenza A/B   Order: [id number]   Status:  Final result   Visible to patient:  No (Not Released) Next appt:  None Dx:  Flu-like symptoms   Component 11:25 AM   Rapid Influenza A-B positive    Internal Control Positive Positive    Internal Control Negative Negative          Specimen Collected: 11/20/19 11:25 AM Last Resulted: 11/20/19 11:26 AM           Positive Influenza B      Assessment / Plan:    1. Flu-like symptoms  - POCT Influenza A/B    2. Influenza B  - oseltamivir (TAMIFLU) 75 MG Cap; 1 CAP BY MOUTH TWICE A DAY X 5 DAYS.  Dispense: 10 Cap; Refill: 0      Work note given - excuse for 11/20 thru and including 11/22/19. May return on 11/22/19 if feeling better.    Discussed with her DDX, management options, and risks, benefits, and alternatives to treatment plan agreed upon.    May continue with OTC meds for symptoms prn.    Agreeable to medication prescribed.    Discussed expected course of duration, time for improvement, and to seek follow-up in Emergency Room, urgent care, or with PCP if getting worse at any time or not improving within  expected time frame.

## 2019-11-20 NOTE — LETTER
November 20, 2019         Patient: Jenna Dover   YOB: 1995   Date of Visit: 11/20/2019           To Whom it May Concern:    Jenna Dover was seen in my clinic on 11/20/2019.     Please excuse from work for 11/20 thru and including 11/22/19 due to medical condition.    May return on 11/22/19 if feeling better.    If you have any questions or concerns, please don't hesitate to call.        Sincerely,           Pj Hudson M.D.  Electronically Signed

## 2020-02-25 ENCOUNTER — OFFICE VISIT (OUTPATIENT)
Dept: BEHAVIORAL HEALTH | Facility: CLINIC | Age: 25
End: 2020-02-25
Payer: COMMERCIAL

## 2020-02-25 DIAGNOSIS — F43.22 ADJUSTMENT DISORDER WITH ANXIOUS MOOD: ICD-10-CM

## 2020-02-25 PROCEDURE — 90791 PSYCH DIAGNOSTIC EVALUATION: CPT | Performed by: PSYCHOLOGIST
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
## 2020-03-31 ENCOUNTER — APPOINTMENT (OUTPATIENT)
Dept: BEHAVIORAL HEALTH | Facility: CLINIC | Age: 25
End: 2020-03-31
Payer: COMMERCIAL

## 2020-07-22 ENCOUNTER — HOSPITAL ENCOUNTER (OUTPATIENT)
Dept: LAB | Facility: MEDICAL CENTER | Age: 25
End: 2020-07-22
Payer: COMMERCIAL

## 2020-07-22 LAB
BDY FAT % MEASURED: 22.1 %
BP DIAS: 59 MMHG
BP SYS: 108 MMHG
CHOLEST SERPL-MCNC: 157 MG/DL (ref 100–199)
DIABETES HTDIA: NO
EVENT NAME HTEVT: NORMAL
FASTING HTFAS: YES
GLUCOSE SERPL-MCNC: 97 MG/DL (ref 65–99)
HDLC SERPL-MCNC: 55 MG/DL
HYPERTENSION HTHYP: NO
LDLC SERPL CALC-MCNC: 88 MG/DL
SCREENING LOC CITY HTCIT: NORMAL
SCREENING LOC STATE HTSTA: NORMAL
SCREENING LOCATION HTLOC: NORMAL
SMOKING HTSMO: NO
SUBSCRIBER ID HTSID: NORMAL
TRIGL SERPL-MCNC: 70 MG/DL (ref 0–149)

## 2020-07-22 PROCEDURE — 36415 COLL VENOUS BLD VENIPUNCTURE: CPT

## 2020-07-22 PROCEDURE — 80061 LIPID PANEL: CPT

## 2020-07-22 PROCEDURE — S5190 WELLNESS ASSESSMENT BY NONPH: HCPCS

## 2020-07-22 PROCEDURE — 82947 ASSAY GLUCOSE BLOOD QUANT: CPT

## 2020-09-24 ENCOUNTER — NON-PROVIDER VISIT (OUTPATIENT)
Dept: OCCUPATIONAL MEDICINE | Facility: CLINIC | Age: 25
End: 2020-09-24

## 2020-09-24 ENCOUNTER — HOSPITAL ENCOUNTER (OUTPATIENT)
Facility: MEDICAL CENTER | Age: 25
End: 2020-09-24
Attending: PREVENTIVE MEDICINE
Payer: COMMERCIAL

## 2020-09-24 DIAGNOSIS — Z02.89 ENCOUNTER FOR OCCUPATIONAL HEALTH EXAMINATION: ICD-10-CM

## 2020-09-24 PROCEDURE — 86480 TB TEST CELL IMMUN MEASURE: CPT | Performed by: NURSE PRACTITIONER

## 2020-09-26 LAB
GAMMA INTERFERON BACKGROUND BLD IA-ACNC: 1.01 IU/ML
M TB IFN-G BLD-IMP: POSITIVE
M TB IFN-G CD4+ BCKGRND COR BLD-ACNC: 0.54 IU/ML
MITOGEN IGNF BCKGRD COR BLD-ACNC: >10 IU/ML
QFT TB2 - NIL TBQ2: 2.6 IU/ML

## 2020-10-07 ENCOUNTER — TELEPHONE (OUTPATIENT)
Dept: OCCUPATIONAL MEDICINE | Facility: CLINIC | Age: 25
End: 2020-10-07

## 2020-10-07 ENCOUNTER — NON-PROVIDER VISIT (OUTPATIENT)
Dept: OCCUPATIONAL MEDICINE | Facility: CLINIC | Age: 25
End: 2020-10-07

## 2020-10-07 ENCOUNTER — HOSPITAL ENCOUNTER (OUTPATIENT)
Facility: MEDICAL CENTER | Age: 25
End: 2020-10-07
Attending: NURSE PRACTITIONER
Payer: COMMERCIAL

## 2020-10-07 DIAGNOSIS — Z02.89 ENCOUNTER FOR OCCUPATIONAL HEALTH ASSESSMENT: ICD-10-CM

## 2020-10-07 PROCEDURE — 86480 TB TEST CELL IMMUN MEASURE: CPT | Performed by: NURSE PRACTITIONER

## 2020-10-09 LAB
GAMMA INTERFERON BACKGROUND BLD IA-ACNC: 0.46 IU/ML
M TB IFN-G BLD-IMP: NEGATIVE
M TB IFN-G CD4+ BCKGRND COR BLD-ACNC: 0.08 IU/ML
MITOGEN IGNF BCKGRD COR BLD-ACNC: >10 IU/ML
QFT TB2 - NIL TBQ2: 0.29 IU/ML

## 2020-10-13 ENCOUNTER — TELEPHONE (OUTPATIENT)
Dept: OCCUPATIONAL MEDICINE | Facility: CLINIC | Age: 25
End: 2020-10-13

## 2020-10-14 ENCOUNTER — OFFICE VISIT (OUTPATIENT)
Dept: URGENT CARE | Facility: CLINIC | Age: 25
End: 2020-10-14
Payer: COMMERCIAL

## 2020-10-14 VITALS
WEIGHT: 125 LBS | TEMPERATURE: 98.7 F | SYSTOLIC BLOOD PRESSURE: 100 MMHG | HEART RATE: 94 BPM | DIASTOLIC BLOOD PRESSURE: 64 MMHG | OXYGEN SATURATION: 95 % | BODY MASS INDEX: 22.86 KG/M2

## 2020-10-14 DIAGNOSIS — H66.001 NON-RECURRENT ACUTE SUPPURATIVE OTITIS MEDIA OF RIGHT EAR WITHOUT SPONTANEOUS RUPTURE OF TYMPANIC MEMBRANE: ICD-10-CM

## 2020-10-14 PROCEDURE — 99214 OFFICE O/P EST MOD 30 MIN: CPT | Performed by: PHYSICIAN ASSISTANT

## 2020-10-14 RX ORDER — AMOXICILLIN 500 MG/1
500 CAPSULE ORAL 2 TIMES DAILY
Qty: 20 CAP | Refills: 0 | Status: SHIPPED | OUTPATIENT
Start: 2020-10-14 | End: 2020-10-24

## 2020-10-14 ASSESSMENT — ENCOUNTER SYMPTOMS
CHILLS: 1
WHEEZING: 0
PALPITATIONS: 0
SINUS PAIN: 0
DIAPHORESIS: 0
DIARRHEA: 0
DIZZINESS: 0
SORE THROAT: 0
ABDOMINAL PAIN: 0
HEADACHES: 0
FEVER: 0
COUGH: 0
SHORTNESS OF BREATH: 0
VOMITING: 0
SPUTUM PRODUCTION: 0
STRIDOR: 0
NAUSEA: 0
MYALGIAS: 0
EYE PAIN: 0

## 2020-10-14 NOTE — PROGRESS NOTES
Subjective:   Jenna Moyer is a 25 y.o. female who presents for Otalgia (right ear pain, sinus congestion, chills last night, sensitive skin, started yesterday)    HPI:  This is a very pleasant 25-year-old female presented to the clinic with right ear pain that has progressively been worsening over the last week.  Patient states the pain is a constant deep ache with some associated radiation to the right side of her neck.  She does have a history of acute otitis media.  She states last night she noticed worsening sinus congestion mild chills and had some sensitive skin.  The patient has had the flu in the past and states this feels similar.  She took her temperature and has not had a fever.  She denies any significant body aches, nausea, vomiting or diarrhea.  She denies any cough or sore throat.  She currently has not done anything for her symptoms.    Review of Systems   Constitutional: Positive for chills. Negative for diaphoresis, fever and malaise/fatigue.   HENT: Positive for congestion and ear pain. Negative for sinus pain and sore throat.    Eyes: Negative for pain.   Respiratory: Negative for cough, sputum production, shortness of breath, wheezing and stridor.    Cardiovascular: Negative for chest pain and palpitations.   Gastrointestinal: Negative for abdominal pain, diarrhea, nausea and vomiting.   Musculoskeletal: Negative for myalgias.   Skin:        Sensitive skin.   Neurological: Negative for dizziness and headaches.   Endo/Heme/Allergies: Negative for environmental allergies.       Medications:    • amoxicillin Caps  • oseltamivir Caps    Allergies: Patient has no known allergies.    Problem List: Jenna Moyer has Moderate episode of recurrent major depressive disorder (HCC); Encounter for medical examination to establish care; and Nonintractable episodic headache on their problem list.    Surgical History:  Past Surgical History:   Procedure Laterality Date   • OTHER      wisdom teeth       Past  Social Hx: Jenna Moyer  reports that she has never smoked. She has never used smokeless tobacco. She reports current alcohol use of about 0.6 oz of alcohol per week. She reports that she does not use drugs.     Past Family Hx:  Jenna Moyer family history includes Arterial Aneurysm (age of onset: 45) in her maternal grandmother; Bipolar disorder in her paternal aunt; Cancer in her paternal grandfather; Hypertension in her father; Migraines in her sister.     Problem list, medications, and allergies reviewed by myself today in Epic.     Objective:     /64   Pulse 94   Temp 37.1 °C (98.7 °F) (Temporal)   Wt 56.7 kg (125 lb)   SpO2 95%   BMI 22.86 kg/m²     Physical Exam  Constitutional:       General: She is not in acute distress.     Appearance: Normal appearance. She is not ill-appearing, toxic-appearing or diaphoretic.   HENT:      Head: Normocephalic and atraumatic.      Right Ear: Ear canal and external ear normal.      Left Ear: Tympanic membrane, ear canal and external ear normal.      Ears:      Comments: Purulence behind the right TM.  TM erythematous with mild bulging.     Nose: Nose normal. No congestion or rhinorrhea.      Mouth/Throat:      Mouth: Mucous membranes are moist.      Pharynx: No oropharyngeal exudate or posterior oropharyngeal erythema.   Eyes:      Conjunctiva/sclera: Conjunctivae normal.   Neck:      Musculoskeletal: Normal range of motion. No neck rigidity or muscular tenderness.   Cardiovascular:      Rate and Rhythm: Normal rate and regular rhythm.      Pulses: Normal pulses.      Heart sounds: Normal heart sounds.   Pulmonary:      Effort: Pulmonary effort is normal.      Breath sounds: Normal breath sounds. No wheezing.   Abdominal:      Palpations: Abdomen is soft.      Tenderness: There is no abdominal tenderness.   Lymphadenopathy:      Cervical: No cervical adenopathy.   Skin:     General: Skin is warm and dry.      Capillary Refill: Capillary refill takes less than 2  seconds.   Neurological:      General: No focal deficit present.      Mental Status: She is alert and oriented to person, place, and time. Mental status is at baseline.   Psychiatric:         Mood and Affect: Mood normal.         Thought Content: Thought content normal.           Assessment/Plan:     Diagnosis and associated orders:     1. Non-recurrent acute suppurative otitis media of right ear without spontaneous rupture of tympanic membrane  amoxicillin (AMOXIL) 500 MG Cap      Comments/MDM:       • Physical exam the patient had purulence and mild bulging behind the right TM.  Consistent with acute otitis media.  Lung sounds were clear to auscultation.  Vital signs were stable.  At this time she declined to be tested for the flu or Covid.  Work note was provided.  If symptoms progress she agreed to return to clinic to be tested.  • Take antibiotic drops as directed.  Take with food.  • May use Tylenol and ibuprofen as needed for pain and fever.               Differential diagnosis, natural history, supportive care, and indications for immediate follow-up discussed.    Advised the patient to follow-up with the primary care physician for recheck, reevaluation, and consideration of further management.    Please note that this dictation was created using voice recognition software. I have made reasonable attempt to correct obvious errors, but I expect that there are errors of grammar and possibly content that I did not discover before finalizing the note.    This note was electronically signed by ERIC Martinez PA-C

## 2020-10-14 NOTE — LETTER
[facility] [place] [id number] [facility] [address]     October 14, 2020    Patient: Jenna Moyer   YOB: 1995   Date of Visit: 10/14/2020       To Whom It May Concern:    Jenna Moyer was seen and treated in our department on 10/14/2020.  Please excuse from work from 10/14/2020 through 10/16/2020.  Please call with any questions or concerns at [phone].    Sincerely,     Pelon Martinez P.A.-C.

## 2020-12-11 ENCOUNTER — HOSPITAL ENCOUNTER (OUTPATIENT)
Dept: LAB | Facility: MEDICAL CENTER | Age: 25
End: 2020-12-11
Attending: PHYSICIAN ASSISTANT
Payer: COMMERCIAL

## 2020-12-11 LAB
APPEARANCE UR: CLEAR
BILIRUB UR QL STRIP.AUTO: NEGATIVE
COLOR UR: YELLOW
GLUCOSE UR STRIP.AUTO-MCNC: NEGATIVE MG/DL
KETONES UR STRIP.AUTO-MCNC: NEGATIVE MG/DL
LEUKOCYTE ESTERASE UR QL STRIP.AUTO: NEGATIVE
MICRO URNS: NORMAL
NITRITE UR QL STRIP.AUTO: NEGATIVE
PH UR STRIP.AUTO: 6.5 [PH] (ref 5–8)
PROT UR QL STRIP: NEGATIVE MG/DL
RBC UR QL AUTO: NEGATIVE
SP GR UR STRIP.AUTO: 1.02
UROBILINOGEN UR STRIP.AUTO-MCNC: 0.2 MG/DL

## 2020-12-11 PROCEDURE — 81003 URINALYSIS AUTO W/O SCOPE: CPT

## 2020-12-16 ENCOUNTER — HOSPITAL ENCOUNTER (OUTPATIENT)
Dept: LAB | Facility: MEDICAL CENTER | Age: 25
End: 2020-12-16
Attending: OBSTETRICS & GYNECOLOGY
Payer: COMMERCIAL

## 2020-12-16 PROCEDURE — 87491 CHLMYD TRACH DNA AMP PROBE: CPT

## 2020-12-16 PROCEDURE — 87591 N.GONORRHOEAE DNA AMP PROB: CPT

## 2020-12-16 PROCEDURE — 88175 CYTOPATH C/V AUTO FLUID REDO: CPT

## 2020-12-20 DIAGNOSIS — Z23 NEED FOR VACCINATION: ICD-10-CM

## 2021-01-13 ENCOUNTER — HOSPITAL ENCOUNTER (OUTPATIENT)
Dept: LAB | Facility: MEDICAL CENTER | Age: 26
End: 2021-01-13
Attending: OBSTETRICS & GYNECOLOGY
Payer: COMMERCIAL

## 2021-01-13 LAB
ABO GROUP BLD: NORMAL
BASOPHILS # BLD AUTO: 0.2 % (ref 0–1.8)
BASOPHILS # BLD: 0.02 K/UL (ref 0–0.12)
BLD GP AB SCN SERPL QL: NORMAL
EOSINOPHIL # BLD AUTO: 0.07 K/UL (ref 0–0.51)
EOSINOPHIL NFR BLD: 0.8 % (ref 0–6.9)
ERYTHROCYTE [DISTWIDTH] IN BLOOD BY AUTOMATED COUNT: 42.1 FL (ref 35.9–50)
HBV SURFACE AG SER QL: NORMAL
HCT VFR BLD AUTO: 38.9 % (ref 37–47)
HCV AB SER QL: NORMAL
HGB BLD-MCNC: 13 G/DL (ref 12–16)
HIV 1+2 AB+HIV1 P24 AG SERPL QL IA: NORMAL
IMM GRANULOCYTES # BLD AUTO: 0.02 K/UL (ref 0–0.11)
IMM GRANULOCYTES NFR BLD AUTO: 0.2 % (ref 0–0.9)
LYMPHOCYTES # BLD AUTO: 1.97 K/UL (ref 1–4.8)
LYMPHOCYTES NFR BLD: 22.7 % (ref 22–41)
MCH RBC QN AUTO: 30.6 PG (ref 27–33)
MCHC RBC AUTO-ENTMCNC: 33.4 G/DL (ref 33.6–35)
MCV RBC AUTO: 91.5 FL (ref 81.4–97.8)
MONOCYTES # BLD AUTO: 0.55 K/UL (ref 0–0.85)
MONOCYTES NFR BLD AUTO: 6.3 % (ref 0–13.4)
NEUTROPHILS # BLD AUTO: 6.05 K/UL (ref 2–7.15)
NEUTROPHILS NFR BLD: 69.8 % (ref 44–72)
NRBC # BLD AUTO: 0 K/UL
NRBC BLD-RTO: 0 /100 WBC
PLATELET # BLD AUTO: 230 K/UL (ref 164–446)
PMV BLD AUTO: 11.4 FL (ref 9–12.9)
RBC # BLD AUTO: 4.25 M/UL (ref 4.2–5.4)
RH BLD: NORMAL
RUBV AB SER QL: 30.2 IU/ML
TREPONEMA PALLIDUM IGG+IGM AB [PRESENCE] IN SERUM OR PLASMA BY IMMUNOASSAY: NORMAL
WBC # BLD AUTO: 8.7 K/UL (ref 4.8–10.8)

## 2021-01-13 PROCEDURE — 87340 HEPATITIS B SURFACE AG IA: CPT

## 2021-01-13 PROCEDURE — 86762 RUBELLA ANTIBODY: CPT

## 2021-01-13 PROCEDURE — 86803 HEPATITIS C AB TEST: CPT

## 2021-01-13 PROCEDURE — 86780 TREPONEMA PALLIDUM: CPT

## 2021-01-13 PROCEDURE — 36415 COLL VENOUS BLD VENIPUNCTURE: CPT

## 2021-01-13 PROCEDURE — 87077 CULTURE AEROBIC IDENTIFY: CPT

## 2021-01-13 PROCEDURE — 87389 HIV-1 AG W/HIV-1&-2 AB AG IA: CPT

## 2021-01-13 PROCEDURE — 86850 RBC ANTIBODY SCREEN: CPT

## 2021-01-13 PROCEDURE — 85025 COMPLETE CBC W/AUTO DIFF WBC: CPT

## 2021-01-13 PROCEDURE — 86901 BLOOD TYPING SEROLOGIC RH(D): CPT

## 2021-01-13 PROCEDURE — 87086 URINE CULTURE/COLONY COUNT: CPT

## 2021-01-13 PROCEDURE — 86900 BLOOD TYPING SEROLOGIC ABO: CPT

## 2021-01-15 LAB
BACTERIA UR CULT: ABNORMAL
BACTERIA UR CULT: ABNORMAL
SIGNIFICANT IND 70042: ABNORMAL
SITE SITE: ABNORMAL
SOURCE SOURCE: ABNORMAL

## 2021-02-08 ENCOUNTER — HOSPITAL ENCOUNTER (OUTPATIENT)
Dept: LAB | Facility: MEDICAL CENTER | Age: 26
End: 2021-02-08
Attending: OBSTETRICS & GYNECOLOGY
Payer: COMMERCIAL

## 2021-02-08 PROCEDURE — 36415 COLL VENOUS BLD VENIPUNCTURE: CPT

## 2021-02-08 PROCEDURE — 82105 ALPHA-FETOPROTEIN SERUM: CPT

## 2021-02-11 LAB
# FETUSES US: NORMAL
AFP MOM SERPL: 1.04
AFP SERPL-MCNC: 37 NG/ML
AGE - REPORTED: 26 YR
CURRENT SMOKER: NO
FAMILY MEMBER DISEASES HX: NO
GA METHOD: NORMAL
GA: NORMAL WK
IDDM PATIENT QL: NO
INTEGRATED SCN PATIENT-IMP: NORMAL
SPECIMEN DRAWN SERPL: NORMAL

## 2021-04-19 ENCOUNTER — HOSPITAL ENCOUNTER (OUTPATIENT)
Facility: MEDICAL CENTER | Age: 26
End: 2021-04-19
Attending: OBSTETRICS & GYNECOLOGY
Payer: COMMERCIAL

## 2021-04-19 PROCEDURE — 85018 HEMOGLOBIN: CPT

## 2021-04-19 PROCEDURE — 36415 COLL VENOUS BLD VENIPUNCTURE: CPT

## 2021-04-19 PROCEDURE — 85014 HEMATOCRIT: CPT

## 2021-04-19 PROCEDURE — 85049 AUTOMATED PLATELET COUNT: CPT

## 2021-04-19 PROCEDURE — 82950 GLUCOSE TEST: CPT

## 2021-04-19 PROCEDURE — 86780 TREPONEMA PALLIDUM: CPT

## 2021-04-20 LAB
GLUCOSE 1H P 50 G GLC PO SERPL-MCNC: 150 MG/DL (ref 70–139)
HCT VFR BLD AUTO: 36.3 % (ref 37–47)
HGB BLD-MCNC: 12 G/DL (ref 12–16)
PLATELET # BLD AUTO: 211 K/UL (ref 164–446)
TREPONEMA PALLIDUM IGG+IGM AB [PRESENCE] IN SERUM OR PLASMA BY IMMUNOASSAY: NORMAL

## 2021-05-03 ENCOUNTER — HOSPITAL ENCOUNTER (OUTPATIENT)
Dept: LAB | Facility: MEDICAL CENTER | Age: 26
End: 2021-05-03
Attending: OBSTETRICS & GYNECOLOGY
Payer: COMMERCIAL

## 2021-05-03 PROCEDURE — 82951 GLUCOSE TOLERANCE TEST (GTT): CPT

## 2021-05-03 PROCEDURE — 36415 COLL VENOUS BLD VENIPUNCTURE: CPT

## 2021-05-03 PROCEDURE — 82952 GTT-ADDED SAMPLES: CPT

## 2021-05-04 LAB
GLUCOSE 1H P CHAL SERPL-MCNC: 189 MG/DL (ref 65–180)
GLUCOSE 2H P CHAL SERPL-MCNC: 165 MG/DL (ref 65–155)
GLUCOSE 3H P CHAL SERPL-MCNC: 144 MG/DL (ref 65–140)
GLUCOSE BS SERPL-MCNC: 80 MG/DL (ref 65–95)

## 2021-05-18 ENCOUNTER — OFFICE VISIT (OUTPATIENT)
Dept: HEALTH INFORMATION MANAGEMENT | Facility: MEDICAL CENTER | Age: 26
End: 2021-05-18
Payer: COMMERCIAL

## 2021-05-18 VITALS — WEIGHT: 141.1 LBS | HEIGHT: 63 IN | BODY MASS INDEX: 25 KG/M2

## 2021-05-18 DIAGNOSIS — O24.419 GESTATIONAL DIABETES MELLITUS (GDM) IN THIRD TRIMESTER, GESTATIONAL DIABETES METHOD OF CONTROL UNSPECIFIED: ICD-10-CM

## 2021-05-18 PROCEDURE — G0108 DIAB MANAGE TRN  PER INDIV: HCPCS | Performed by: INTERNAL MEDICINE

## 2021-05-18 PROCEDURE — 99212 OFFICE O/P EST SF 10 MIN: CPT | Performed by: DIETITIAN, REGISTERED

## 2021-05-18 NOTE — LETTER
May 19, 2021                   Re: Jenna Moyer     1995         [id number]       Alisa Tijerina M.D.  [address]  [facility] [address]  [place] [address]      Dear :Alisa Tijerina M.D.    On 5/19/2021, your patient Jenna Moyer, received 1 hours of nutrition training from the [facility] for management of her gestational diabetes.  Her EDC is Estimated Date of Delivery: None noted..  We taught the following subjects:    Introduction to gestational diabetes, benefits and responsibilities of patient, physiology of diabetes and the diease process, benefits of blood glucose monitoring and record keeping, medication action and possible side effects, hypoglycemia, sick day management, exercise, stress reduction and travel with diabetes.      Pathophysiology of diabetes in pregnancy    Discuss  potential maternal and fetal complications in pregnancy with diabetes.    Importance of blood glucose monitoring   Jenna brought in a ReliOn meter but did not have all the supplies needed to complete a return demo. Pt states she will follow up with her doctor for further instruction   Testing: fasting and one hour after meals,  expected ranges and rationale for strict control.        Recognition and treatment of hypoglycemia.   Should patient require insulin later in pregnancy, she would need further education.   Discuss benefits and risks of exercise in pregnancy  Discuss when to call Doctor  Discuss sick day care    Patient was provided with a 2000 calorie meal plan with 3 meals and 3 snacks.  Meal plan contains 195 grams of carbohydrates per day.   Importance of meal planning in diabetes management during pregnancy  Importance of consistent timing of meals and snacks and agreed upon times  Avoidance of simple carbohydrates  Metabolism of food components relating to pregnancy  Identification of foods in food groups  Patient demonstrates adequate ability to utilize meal planning manual for reference  Plan 3 meals and  3 snacks with 90% accuracy  Review basic principles of eating out  Reviewed precautions with artificial sweeteners    Comments: Jenna agreed to follow the meal plan and to eat at the times agreed upon.  She will check blood glucose 4 times a day and record the values in her log book.      Jenna Moyer was encouraged to discuss this further with you.    Hopefully this will help in your management of her care.  If we can be of further assistance, please feel free to call.    Thank you for the referral.    Sincerely,    Jerri Alexis R.D.  [facility]  [phone]

## 2021-05-18 NOTE — PROGRESS NOTES
The pt brought in a ReliOn meter but did not have all the supplies needed to complete a return demo. Pt states she will follow up with her doctor for further instruction. She was instructed on checking her blood sugar 4x a day (fasting and 1 hour after meals), target blood sugar levels, how to record blood sugar reading in her logbook and bring to each appointment, appropriate sick day care, and exercise guidelines. Discussed what she needs to do after delivery for herself and family to limit risk for type two diabetes.     The pt received a 2000 calorie meal plan consisting of 3 meals and 3 snacks (see media for copy of meal plan).   Pt's prepregnancy weight was: 128 lb. She has gained 13.1 lb so far in this pregnancy.   Recommended meal times:   B: 5:45  S: 9:00  L: 12:00  S: 3:00  D: 5:00-6:00  S: 7:45-8:45    Pt was educated on carbohydrate containing foods vs non carbohydrate containing foods, the importance of small frequent meals, limiting carbohydrate to 1 serving in the morning, no fruit before noon/12pm, and avoiding all concentrated sweets for the remainder of her pregnancy. Explained the importance of not going >4hrs btwn eating during the day and no longer than 10hours overnight. Patient agrees to follow the meal plan and guidelines provided.

## 2021-06-24 ENCOUNTER — HOSPITAL ENCOUNTER (OUTPATIENT)
Dept: LAB | Facility: MEDICAL CENTER | Age: 26
End: 2021-06-24
Attending: OBSTETRICS & GYNECOLOGY
Payer: COMMERCIAL

## 2021-06-24 PROCEDURE — 87150 DNA/RNA AMPLIFIED PROBE: CPT

## 2021-06-24 PROCEDURE — 87081 CULTURE SCREEN ONLY: CPT

## 2021-06-25 LAB — GP B STREP DNA SPEC QL NAA+PROBE: POSITIVE

## 2021-07-25 ENCOUNTER — HOSPITAL ENCOUNTER (INPATIENT)
Facility: MEDICAL CENTER | Age: 26
LOS: 2 days | End: 2021-07-27
Attending: OBSTETRICS & GYNECOLOGY | Admitting: OBSTETRICS & GYNECOLOGY
Payer: COMMERCIAL

## 2021-07-25 ENCOUNTER — APPOINTMENT (OUTPATIENT)
Dept: OBGYN | Facility: MEDICAL CENTER | Age: 26
End: 2021-07-25
Attending: OBSTETRICS & GYNECOLOGY
Payer: COMMERCIAL

## 2021-07-25 LAB
AST SERPL-CCNC: 32 U/L (ref 12–45)
BASOPHILS # BLD AUTO: 0.4 % (ref 0–1.8)
BASOPHILS # BLD: 0.03 K/UL (ref 0–0.12)
BUN SERPL-MCNC: 7 MG/DL (ref 8–22)
CREAT SERPL-MCNC: 0.59 MG/DL (ref 0.5–1.4)
EOSINOPHIL # BLD AUTO: 0.03 K/UL (ref 0–0.51)
EOSINOPHIL NFR BLD: 0.4 % (ref 0–6.9)
ERYTHROCYTE [DISTWIDTH] IN BLOOD BY AUTOMATED COUNT: 43.8 FL (ref 35.9–50)
ERYTHROCYTE [DISTWIDTH] IN BLOOD BY AUTOMATED COUNT: 43.9 FL (ref 35.9–50)
GLUCOSE BLD-MCNC: 71 MG/DL (ref 65–99)
GLUCOSE BLD-MCNC: 78 MG/DL (ref 65–99)
GLUCOSE BLD-MCNC: 84 MG/DL (ref 65–99)
GLUCOSE BLD-MCNC: 89 MG/DL (ref 65–99)
HCT VFR BLD AUTO: 37 % (ref 37–47)
HCT VFR BLD AUTO: 37.1 % (ref 37–47)
HGB BLD-MCNC: 12.5 G/DL (ref 12–16)
HGB BLD-MCNC: 12.6 G/DL (ref 12–16)
HOLDING TUBE BB 8507: NORMAL
IMM GRANULOCYTES # BLD AUTO: 0.03 K/UL (ref 0–0.11)
IMM GRANULOCYTES NFR BLD AUTO: 0.4 % (ref 0–0.9)
LYMPHOCYTES # BLD AUTO: 1.67 K/UL (ref 1–4.8)
LYMPHOCYTES NFR BLD: 24.6 % (ref 22–41)
MCH RBC QN AUTO: 31.4 PG (ref 27–33)
MCH RBC QN AUTO: 31.4 PG (ref 27–33)
MCHC RBC AUTO-ENTMCNC: 33.8 G/DL (ref 33.6–35)
MCHC RBC AUTO-ENTMCNC: 34 G/DL (ref 33.6–35)
MCV RBC AUTO: 92.5 FL (ref 81.4–97.8)
MCV RBC AUTO: 93 FL (ref 81.4–97.8)
MONOCYTES # BLD AUTO: 0.56 K/UL (ref 0–0.85)
MONOCYTES NFR BLD AUTO: 8.2 % (ref 0–13.4)
NEUTROPHILS # BLD AUTO: 4.48 K/UL (ref 2–7.15)
NEUTROPHILS NFR BLD: 66 % (ref 44–72)
NRBC # BLD AUTO: 0 K/UL
NRBC BLD-RTO: 0 /100 WBC
PLATELET # BLD AUTO: 177 K/UL (ref 164–446)
PLATELET # BLD AUTO: 181 K/UL (ref 164–446)
PMV BLD AUTO: 12.7 FL (ref 9–12.9)
PMV BLD AUTO: 12.7 FL (ref 9–12.9)
RBC # BLD AUTO: 3.98 M/UL (ref 4.2–5.4)
RBC # BLD AUTO: 4.01 M/UL (ref 4.2–5.4)
SARS-COV+SARS-COV-2 AG RESP QL IA.RAPID: NOTDETECTED
SPECIMEN SOURCE: NORMAL
URATE SERPL-MCNC: 7.8 MG/DL (ref 1.9–8.2)
WBC # BLD AUTO: 6.8 K/UL (ref 4.8–10.8)
WBC # BLD AUTO: 6.8 K/UL (ref 4.8–10.8)

## 2021-07-25 PROCEDURE — 700111 HCHG RX REV CODE 636 W/ 250 OVERRIDE (IP): Performed by: OBSTETRICS & GYNECOLOGY

## 2021-07-25 PROCEDURE — 85027 COMPLETE CBC AUTOMATED: CPT

## 2021-07-25 PROCEDURE — 700102 HCHG RX REV CODE 250 W/ 637 OVERRIDE(OP): Performed by: OBSTETRICS & GYNECOLOGY

## 2021-07-25 PROCEDURE — 700105 HCHG RX REV CODE 258: Performed by: OBSTETRICS & GYNECOLOGY

## 2021-07-25 PROCEDURE — 85025 COMPLETE CBC W/AUTO DIFF WBC: CPT

## 2021-07-25 PROCEDURE — 36415 COLL VENOUS BLD VENIPUNCTURE: CPT

## 2021-07-25 PROCEDURE — 87426 SARSCOV CORONAVIRUS AG IA: CPT

## 2021-07-25 PROCEDURE — 84450 TRANSFERASE (AST) (SGOT): CPT

## 2021-07-25 PROCEDURE — 82962 GLUCOSE BLOOD TEST: CPT

## 2021-07-25 PROCEDURE — 82565 ASSAY OF CREATININE: CPT

## 2021-07-25 PROCEDURE — 84520 ASSAY OF UREA NITROGEN: CPT

## 2021-07-25 PROCEDURE — 84550 ASSAY OF BLOOD/URIC ACID: CPT

## 2021-07-25 PROCEDURE — 770002 HCHG ROOM/CARE - OB PRIVATE (112)

## 2021-07-25 PROCEDURE — A9270 NON-COVERED ITEM OR SERVICE: HCPCS | Performed by: OBSTETRICS & GYNECOLOGY

## 2021-07-25 RX ORDER — SODIUM CHLORIDE, SODIUM LACTATE, POTASSIUM CHLORIDE, CALCIUM CHLORIDE 600; 310; 30; 20 MG/100ML; MG/100ML; MG/100ML; MG/100ML
INJECTION, SOLUTION INTRAVENOUS CONTINUOUS
Status: ACTIVE | OUTPATIENT
Start: 2021-07-25 | End: 2021-07-25

## 2021-07-25 RX ORDER — MISOPROSTOL 200 UG/1
800 TABLET ORAL
Status: DISCONTINUED | OUTPATIENT
Start: 2021-07-25 | End: 2021-07-26 | Stop reason: HOSPADM

## 2021-07-25 RX ADMIN — AMPICILLIN SODIUM 1 G: 1 INJECTION, POWDER, FOR SOLUTION INTRAMUSCULAR; INTRAVENOUS at 17:50

## 2021-07-25 RX ADMIN — AMPICILLIN SODIUM 1 G: 1 INJECTION, POWDER, FOR SOLUTION INTRAMUSCULAR; INTRAVENOUS at 22:39

## 2021-07-25 RX ADMIN — AMPICILLIN SODIUM 2 G: 2 INJECTION, POWDER, FOR SOLUTION INTRAMUSCULAR; INTRAVENOUS at 13:57

## 2021-07-25 RX ADMIN — MISOPROSTOL 25 MCG: 100 TABLET ORAL at 09:26

## 2021-07-25 RX ADMIN — MISOPROSTOL 25 MCG: 100 TABLET ORAL at 13:43

## 2021-07-25 RX ADMIN — OXYTOCIN 1 MILLI-UNITS/MIN: 10 INJECTION, SOLUTION INTRAMUSCULAR; INTRAVENOUS at 19:47

## 2021-07-25 RX ADMIN — SODIUM CHLORIDE, POTASSIUM CHLORIDE, SODIUM LACTATE AND CALCIUM CHLORIDE: 600; 310; 30; 20 INJECTION, SOLUTION INTRAVENOUS at 13:53

## 2021-07-25 ASSESSMENT — LIFESTYLE VARIABLES
ALCOHOL_USE: NO
EVER_SMOKED: NEVER
ON A TYPICAL DAY WHEN YOU DRINK ALCOHOL HOW MANY DRINKS DO YOU HAVE: 0
HOW MANY TIMES IN THE PAST YEAR HAVE YOU HAD 5 OR MORE DRINKS IN A DAY: 0
HAVE YOU EVER FELT YOU SHOULD CUT DOWN ON YOUR DRINKING: NO
EVER HAD A DRINK FIRST THING IN THE MORNING TO STEADY YOUR NERVES TO GET RID OF A HANGOVER: NO
AVERAGE NUMBER OF DAYS PER WEEK YOU HAVE A DRINK CONTAINING ALCOHOL: 0
CONSUMPTION TOTAL: INCOMPLETE
EVER FELT BAD OR GUILTY ABOUT YOUR DRINKING: NO
DOES PATIENT WANT TO STOP DRINKING: NO

## 2021-07-25 ASSESSMENT — COPD QUESTIONNAIRES
COPD SCREENING SCORE: 0
DO YOU EVER COUGH UP ANY MUCUS OR PHLEGM?: NO/ONLY WITH OCCASIONAL COLDS OR INFECTIONS
DURING THE PAST 4 WEEKS HOW MUCH DID YOU FEEL SHORT OF BREATH: NONE/LITTLE OF THE TIME
IN THE PAST 12 MONTHS DO YOU DO LESS THAN YOU USED TO BECAUSE OF YOUR BREATHING PROBLEMS: DISAGREE/UNSURE
HAVE YOU SMOKED AT LEAST 100 CIGARETTES IN YOUR ENTIRE LIFE: NO/DON'T KNOW

## 2021-07-25 ASSESSMENT — PATIENT HEALTH QUESTIONNAIRE - PHQ9
1. LITTLE INTEREST OR PLEASURE IN DOING THINGS: NOT AT ALL
SUM OF ALL RESPONSES TO PHQ9 QUESTIONS 1 AND 2: 0
2. FEELING DOWN, DEPRESSED, IRRITABLE, OR HOPELESS: NOT AT ALL

## 2021-07-25 NOTE — PROGRESS NOTES
"Labor Progress Note    Jenna Moyer   39w6d/A2DM      Subjective:  Pt feeling minimal cramps. Pt with good fetal mvt.  Uterine contractions:yes  Pain: Yes. Severity: mild    Objective:   Vitals:    07/25/21 0853 07/25/21 0855 07/25/21 0859 07/25/21 1257   BP:  128/86  121/72   Pulse: 70 73  64   Resp:    16   Temp:    36.3 °C (97.3 °F)   TempSrc:    Temporal   SpO2: 97%      Weight:   65.8 kg (145 lb)    Height:   1.6 m (5' 3\")      Fetal heart variability: 140's category 1  [place]: q 5  SVE: 1/25/-3, vertex, cytotec 25 mcg per vagina placed by me.       Membranes ruptured: .no    Meds:   Cytotec s/p 2nd dose 25 mcg per vagina  Insulin NPH 8 units qhs  Labs:  Recent Results (from the past 24 hour(s))   Hold Blood Bank Specimen (Not Tested)    Collection Time: 07/25/21  9:15 AM   Result Value Ref Range    Holding Tube - Bb DONE    CBC WITH DIFFERENTIAL    Collection Time: 07/25/21  9:15 AM   Result Value Ref Range    WBC 6.8 4.8 - 10.8 K/uL    RBC 4.01 (L) 4.20 - 5.40 M/uL    Hemoglobin 12.6 12.0 - 16.0 g/dL    Hematocrit 37.1 37.0 - 47.0 %    MCV 92.5 81.4 - 97.8 fL    MCH 31.4 27.0 - 33.0 pg    MCHC 34.0 33.6 - 35.0 g/dL    RDW 43.9 35.9 - 50.0 fL    Platelet Count 177 164 - 446 K/uL    MPV 12.7 9.0 - 12.9 fL    Neutrophils-Polys 66.00 44.00 - 72.00 %    Lymphocytes 24.60 22.00 - 41.00 %    Monocytes 8.20 0.00 - 13.40 %    Eosinophils 0.40 0.00 - 6.90 %    Basophils 0.40 0.00 - 1.80 %    Immature Granulocytes 0.40 0.00 - 0.90 %    Nucleated RBC 0.00 /100 WBC    Neutrophils (Absolute) 4.48 2.00 - 7.15 K/uL    Lymphs (Absolute) 1.67 1.00 - 4.80 K/uL    Monos (Absolute) 0.56 0.00 - 0.85 K/uL    Eos (Absolute) 0.03 0.00 - 0.51 K/uL    Baso (Absolute) 0.03 0.00 - 0.12 K/uL    Immature Granulocytes (abs) 0.03 0.00 - 0.11 K/uL    NRBC (Absolute) 0.00 K/uL   PREGNANCY INDUCED HYPERTENSION PROFILE-CBC W/O, BUN, CREATININE, AST, URIC ACID    Collection Time: 07/25/21  9:15 AM   Result Value Ref Range    WBC 6.8 4.8 - 10.8 " K/uL    RBC 3.98 (L) 4.20 - 5.40 M/uL    Hemoglobin 12.5 12.0 - 16.0 g/dL    Hematocrit 37.0 37.0 - 47.0 %    MCV 93.0 81.4 - 97.8 fL    MCH 31.4 27.0 - 33.0 pg    MCHC 33.8 33.6 - 35.0 g/dL    RDW 43.8 35.9 - 50.0 fL    Platelet Count 181 164 - 446 K/uL    MPV 12.7 9.0 - 12.9 fL    Bun 7 (L) 8 - 22 mg/dL    Creatinine 0.59 0.50 - 1.40 mg/dL    AST(SGOT) 32 12 - 45 U/L    Uric Acid 7.8 1.9 - 8.2 mg/dL   ESTIMATED GFR    Collection Time: 21  9:15 AM   Result Value Ref Range    GFR If African American >60 >60 mL/min/1.73 m 2    GFR If Non African American >60 >60 mL/min/1.73 m 2   SARS-COV Antigen CHAMP: Collect dry nasal swab    Collection Time: 21  9:36 AM   Result Value Ref Range    SARS-CoV-2 Source Nasal Swab     SARS-COV ANTIGEN CHAMP NotDetected Not-Detected   POCT glucose device results    Collection Time: 21 10:41 AM   Result Value Ref Range    Glucose - Accu-Ck 78 65 - 99 mg/dL       Assessment:   39w6d  IOL-Start IOL, I placed 2 nd dose of cytotec 25 mcg, per vagina. Pt now 1 cm dilated. CPM, expt .   A2DM- check FSBS now and q 4 hours in latent labor.  GBBS- positive- start ampicillin per protocol.  Fetal status-reassuring  Cheryl Hernandez M.D.

## 2021-07-25 NOTE — CARE PLAN
Problem: Knowledge Deficit - L&D  Goal: Patient and family/caregivers will demonstrate understanding of plan of care, disease process/condition, diagnostic tests and medications  Outcome: Progressing  Note: POC was discussed with pt.     Problem: Risk for Infection and Impaired Wound Healing  Goal: Patient's wound/surgical incision will decrease in size and heals properly  Outcome: Progressing     Problem: Risk for Infection and Impaired Wound Healing  Goal: Patient will remain free from infection  Note: Pt is free of any sign of infection.   The patient is Stable - Low risk of patient condition declining or worsening         Progress made toward(s) clinical / shift  Pt was educated in POC and signs of infection.    Patient is not progressing towards the following goals:

## 2021-07-25 NOTE — PROGRESS NOTES
1430 Report received from Almaz NAVARRETE, POC disussed.     1550 RN at bedside, pt standing at bedside, pt denies any needs at this time.     1645 RN at bedside, pt resting comfortably    1825  updated on pt status, orders to start pitocin received.     1835 RN at bedside, pt has finished eating dinner. Pt requesting to take a shower prior to pitocin start. Pt provided with towels and IV wrapped.      1910 RN at bedside, report given to BEATRICE Edwards RN, POC discussed.

## 2021-07-25 NOTE — PROGRESS NOTES
EDC  39.6 weeks pt is here for IOL due to GDM and unstable lie. Monitors applied, admit done.SVE done. Closed thick and high,  was asked to confirm the VTX by US .  912  confirmed VTX position.  09  placed a cytotec.  1042 FS was 78. Breakfast was ordered for pt.  1335 SVE done 1 cm thick and high.  was notified.  1344  in the room and cytotec was placed.

## 2021-07-25 NOTE — PROGRESS NOTES
"Labor Progress Note    Jenna Moyer   39w6d/A2DM      Subjective:  Pt here for IOL for A2DM. Pt was scheduled yesterday but was pushed out until today secondary to staffing issues and busy L & D yesterday.  Uterine contractions:no  Pain: No    Objective:   Vitals:    21 0829 21 0855 21 0859   BP: 138/87 128/86    Pulse: 89 73    Resp: 18     Temp: 36.6 °C (97.8 °F)     TempSrc: Temporal     SpO2: 99%     Weight:   65.8 kg (145 lb)   Height:   1.6 m (5' 3\")     Fetal heart variability: FHT: 130's category 1  [place]: irritable to few  SVE: cl/th/high, cytotec 25 mcg per vagina placed by me.     Membranes ruptured: .no   Bedside u/s by me: vertex   Meds:   Cytotec 25 mcg placed just now  Insulin qhs (NPH 8 units)  Labs:  No results found for this or any previous visit (from the past 24 hour(s)).    Assessment:   39w6d/A2DM  IOL-Start IOL, I placed cytotec 25 mcg placed per vagina, and will likely need another dose in 4 hours. Exp .  A2DM- check FSBS now and q 4 hours in latent labor.  GBBS- positive- start ampicillin per protocol.  Fetal status-reassuring        Cheryl Hernandez M.D.          "

## 2021-07-26 ENCOUNTER — ANESTHESIA (OUTPATIENT)
Dept: ANESTHESIOLOGY | Facility: MEDICAL CENTER | Age: 26
End: 2021-07-26
Payer: COMMERCIAL

## 2021-07-26 ENCOUNTER — ANESTHESIA EVENT (OUTPATIENT)
Dept: ANESTHESIOLOGY | Facility: MEDICAL CENTER | Age: 26
End: 2021-07-26
Payer: COMMERCIAL

## 2021-07-26 LAB
ERYTHROCYTE [DISTWIDTH] IN BLOOD BY AUTOMATED COUNT: 43.7 FL (ref 35.9–50)
GLUCOSE BLD-MCNC: 102 MG/DL (ref 65–99)
GLUCOSE BLD-MCNC: 94 MG/DL (ref 65–99)
GLUCOSE BLD-MCNC: 98 MG/DL (ref 65–99)
HCT VFR BLD AUTO: 33.7 % (ref 37–47)
HGB BLD-MCNC: 11.5 G/DL (ref 12–16)
MCH RBC QN AUTO: 31.4 PG (ref 27–33)
MCHC RBC AUTO-ENTMCNC: 34.1 G/DL (ref 33.6–35)
MCV RBC AUTO: 92.1 FL (ref 81.4–97.8)
PLATELET # BLD AUTO: 148 K/UL (ref 164–446)
PMV BLD AUTO: 12.7 FL (ref 9–12.9)
RBC # BLD AUTO: 3.66 M/UL (ref 4.2–5.4)
WBC # BLD AUTO: 12.3 K/UL (ref 4.8–10.8)

## 2021-07-26 PROCEDURE — 0HQ9XZZ REPAIR PERINEUM SKIN, EXTERNAL APPROACH: ICD-10-PCS | Performed by: OBSTETRICS & GYNECOLOGY

## 2021-07-26 PROCEDURE — 700101 HCHG RX REV CODE 250: Performed by: ANESTHESIOLOGY

## 2021-07-26 PROCEDURE — 36415 COLL VENOUS BLD VENIPUNCTURE: CPT

## 2021-07-26 PROCEDURE — 85027 COMPLETE CBC AUTOMATED: CPT

## 2021-07-26 PROCEDURE — 700111 HCHG RX REV CODE 636 W/ 250 OVERRIDE (IP): Performed by: OBSTETRICS & GYNECOLOGY

## 2021-07-26 PROCEDURE — 82962 GLUCOSE BLOOD TEST: CPT

## 2021-07-26 PROCEDURE — 302128 INFUSION PUMP: Performed by: ADVANCED PRACTICE MIDWIFE

## 2021-07-26 PROCEDURE — 3E0P7VZ INTRODUCTION OF HORMONE INTO FEMALE REPRODUCTIVE, VIA NATURAL OR ARTIFICIAL OPENING: ICD-10-PCS | Performed by: OBSTETRICS & GYNECOLOGY

## 2021-07-26 PROCEDURE — 3E033VJ INTRODUCTION OF OTHER HORMONE INTO PERIPHERAL VEIN, PERCUTANEOUS APPROACH: ICD-10-PCS | Performed by: OBSTETRICS & GYNECOLOGY

## 2021-07-26 PROCEDURE — A9270 NON-COVERED ITEM OR SERVICE: HCPCS | Performed by: OBSTETRICS & GYNECOLOGY

## 2021-07-26 PROCEDURE — 700105 HCHG RX REV CODE 258: Performed by: OBSTETRICS & GYNECOLOGY

## 2021-07-26 PROCEDURE — 303615 HCHG EPIDURAL/SPINAL ANESTHESIA FOR LABOR

## 2021-07-26 PROCEDURE — 10H07YZ INSERTION OF OTHER DEVICE INTO PRODUCTS OF CONCEPTION, VIA NATURAL OR ARTIFICIAL OPENING: ICD-10-PCS | Performed by: OBSTETRICS & GYNECOLOGY

## 2021-07-26 PROCEDURE — 770002 HCHG ROOM/CARE - OB PRIVATE (112)

## 2021-07-26 PROCEDURE — 700111 HCHG RX REV CODE 636 W/ 250 OVERRIDE (IP)

## 2021-07-26 PROCEDURE — 10907ZC DRAINAGE OF AMNIOTIC FLUID, THERAPEUTIC FROM PRODUCTS OF CONCEPTION, VIA NATURAL OR ARTIFICIAL OPENING: ICD-10-PCS | Performed by: OBSTETRICS & GYNECOLOGY

## 2021-07-26 PROCEDURE — 700102 HCHG RX REV CODE 250 W/ 637 OVERRIDE(OP): Performed by: OBSTETRICS & GYNECOLOGY

## 2021-07-26 PROCEDURE — 304965 HCHG RECOVERY SERVICES

## 2021-07-26 PROCEDURE — 59409 OBSTETRICAL CARE: CPT

## 2021-07-26 RX ORDER — LIDOCAINE HYDROCHLORIDE AND EPINEPHRINE 15; 5 MG/ML; UG/ML
INJECTION, SOLUTION EPIDURAL
Status: COMPLETED | OUTPATIENT
Start: 2021-07-26 | End: 2021-07-26

## 2021-07-26 RX ORDER — HYDROCODONE BITARTRATE AND ACETAMINOPHEN 5; 325 MG/1; MG/1
1-2 TABLET ORAL EVERY 6 HOURS PRN
Status: DISCONTINUED | OUTPATIENT
Start: 2021-07-26 | End: 2021-07-26

## 2021-07-26 RX ORDER — ONDANSETRON 4 MG/1
4 TABLET, ORALLY DISINTEGRATING ORAL EVERY 6 HOURS PRN
Status: DISCONTINUED | OUTPATIENT
Start: 2021-07-26 | End: 2021-07-27 | Stop reason: HOSPADM

## 2021-07-26 RX ORDER — VITAMIN A ACETATE, BETA CAROTENE, ASCORBIC ACID, CHOLECALCIFEROL, .ALPHA.-TOCOPHEROL ACETATE, DL-, THIAMINE MONONITRATE, RIBOFLAVIN, NIACINAMIDE, PYRIDOXINE HYDROCHLORIDE, FOLIC ACID, CYANOCOBALAMIN, CALCIUM CARBONATE, FERROUS FUMARATE, ZINC OXIDE, CUPRIC OXIDE 3080; 12; 120; 400; 1; 1.84; 3; 20; 22; 920; 25; 200; 27; 10; 2 [IU]/1; UG/1; MG/1; [IU]/1; MG/1; MG/1; MG/1; MG/1; MG/1; [IU]/1; MG/1; MG/1; MG/1; MG/1; MG/1
1 TABLET, FILM COATED ORAL
Status: DISCONTINUED | OUTPATIENT
Start: 2021-07-26 | End: 2021-07-27 | Stop reason: HOSPADM

## 2021-07-26 RX ORDER — METHYLERGONOVINE MALEATE 0.2 MG/ML
0.2 INJECTION INTRAVENOUS
Status: DISCONTINUED | OUTPATIENT
Start: 2021-07-26 | End: 2021-07-27 | Stop reason: HOSPADM

## 2021-07-26 RX ORDER — IBUPROFEN 600 MG/1
600 TABLET ORAL EVERY 6 HOURS PRN
Status: DISCONTINUED | OUTPATIENT
Start: 2021-07-26 | End: 2021-07-26

## 2021-07-26 RX ORDER — CARBOPROST TROMETHAMINE 250 UG/ML
250 INJECTION, SOLUTION INTRAMUSCULAR
Status: DISCONTINUED | OUTPATIENT
Start: 2021-07-26 | End: 2021-07-27 | Stop reason: HOSPADM

## 2021-07-26 RX ORDER — SODIUM CHLORIDE, SODIUM LACTATE, POTASSIUM CHLORIDE, AND CALCIUM CHLORIDE .6; .31; .03; .02 G/100ML; G/100ML; G/100ML; G/100ML
250 INJECTION, SOLUTION INTRAVENOUS PRN
Status: DISCONTINUED | OUTPATIENT
Start: 2021-07-26 | End: 2021-07-26

## 2021-07-26 RX ORDER — ROPIVACAINE HYDROCHLORIDE 2 MG/ML
INJECTION, SOLUTION EPIDURAL; INFILTRATION; PERINEURAL
Status: COMPLETED
Start: 2021-07-26 | End: 2021-07-26

## 2021-07-26 RX ORDER — IBUPROFEN 600 MG/1
600 TABLET ORAL EVERY 6 HOURS PRN
Status: DISCONTINUED | OUTPATIENT
Start: 2021-07-26 | End: 2021-07-27 | Stop reason: HOSPADM

## 2021-07-26 RX ORDER — OXYCODONE HYDROCHLORIDE AND ACETAMINOPHEN 5; 325 MG/1; MG/1
2 TABLET ORAL EVERY 4 HOURS PRN
Status: DISCONTINUED | OUTPATIENT
Start: 2021-07-26 | End: 2021-07-27 | Stop reason: HOSPADM

## 2021-07-26 RX ORDER — BISACODYL 10 MG
10 SUPPOSITORY, RECTAL RECTAL PRN
Status: DISCONTINUED | OUTPATIENT
Start: 2021-07-26 | End: 2021-07-27 | Stop reason: HOSPADM

## 2021-07-26 RX ORDER — ALUMINA, MAGNESIA, AND SIMETHICONE 2400; 2400; 240 MG/30ML; MG/30ML; MG/30ML
20 SUSPENSION ORAL 4 TIMES DAILY PRN
Status: DISCONTINUED | OUTPATIENT
Start: 2021-07-26 | End: 2021-07-27 | Stop reason: HOSPADM

## 2021-07-26 RX ORDER — ACETAMINOPHEN 325 MG/1
650 TABLET ORAL EVERY 4 HOURS PRN
Status: DISCONTINUED | OUTPATIENT
Start: 2021-07-26 | End: 2021-07-27 | Stop reason: HOSPADM

## 2021-07-26 RX ORDER — SODIUM CHLORIDE, SODIUM LACTATE, POTASSIUM CHLORIDE, CALCIUM CHLORIDE 600; 310; 30; 20 MG/100ML; MG/100ML; MG/100ML; MG/100ML
INJECTION, SOLUTION INTRAVENOUS PRN
Status: DISCONTINUED | OUTPATIENT
Start: 2021-07-26 | End: 2021-07-27 | Stop reason: HOSPADM

## 2021-07-26 RX ORDER — MISOPROSTOL 200 UG/1
800 TABLET ORAL
Status: DISCONTINUED | OUTPATIENT
Start: 2021-07-26 | End: 2021-07-27 | Stop reason: HOSPADM

## 2021-07-26 RX ORDER — ROPIVACAINE HYDROCHLORIDE 2 MG/ML
INJECTION, SOLUTION EPIDURAL; INFILTRATION; PERINEURAL CONTINUOUS
Status: DISCONTINUED | OUTPATIENT
Start: 2021-07-26 | End: 2021-07-26

## 2021-07-26 RX ORDER — SODIUM CHLORIDE, SODIUM LACTATE, POTASSIUM CHLORIDE, AND CALCIUM CHLORIDE .6; .31; .03; .02 G/100ML; G/100ML; G/100ML; G/100ML
1000 INJECTION, SOLUTION INTRAVENOUS
Status: DISCONTINUED | OUTPATIENT
Start: 2021-07-26 | End: 2021-07-26

## 2021-07-26 RX ORDER — DOCUSATE SODIUM 100 MG/1
100 CAPSULE, LIQUID FILLED ORAL 2 TIMES DAILY PRN
Status: DISCONTINUED | OUTPATIENT
Start: 2021-07-26 | End: 2021-07-27 | Stop reason: HOSPADM

## 2021-07-26 RX ORDER — ONDANSETRON 2 MG/ML
4 INJECTION INTRAMUSCULAR; INTRAVENOUS EVERY 6 HOURS PRN
Status: DISCONTINUED | OUTPATIENT
Start: 2021-07-26 | End: 2021-07-27 | Stop reason: HOSPADM

## 2021-07-26 RX ORDER — TERBUTALINE SULFATE 1 MG/ML
0.25 INJECTION, SOLUTION SUBCUTANEOUS
Status: DISCONTINUED | OUTPATIENT
Start: 2021-07-26 | End: 2021-07-27 | Stop reason: HOSPADM

## 2021-07-26 RX ADMIN — ROPIVACAINE HYDROCHLORIDE 200 MG: 2 INJECTION, SOLUTION EPIDURAL; INFILTRATION at 01:14

## 2021-07-26 RX ADMIN — ALUMINUM HYDROXIDE, MAGNESIUM HYDROXIDE, AND DIMETHICONE 20 ML: 400; 400; 40 SUSPENSION ORAL at 02:33

## 2021-07-26 RX ADMIN — OXYTOCIN 125 ML/HR: 10 INJECTION, SOLUTION INTRAMUSCULAR; INTRAVENOUS at 12:00

## 2021-07-26 RX ADMIN — AMPICILLIN SODIUM 1 G: 1 INJECTION, POWDER, FOR SOLUTION INTRAMUSCULAR; INTRAVENOUS at 06:31

## 2021-07-26 RX ADMIN — IBUPROFEN 600 MG: 600 TABLET, FILM COATED ORAL at 22:43

## 2021-07-26 RX ADMIN — AMPICILLIN SODIUM 1 G: 1 INJECTION, POWDER, FOR SOLUTION INTRAMUSCULAR; INTRAVENOUS at 02:15

## 2021-07-26 RX ADMIN — LIDOCAINE HYDROCHLORIDE,EPINEPHRINE BITARTRATE 5 ML: 15; .005 INJECTION, SOLUTION EPIDURAL; INFILTRATION; INTRACAUDAL; PERINEURAL at 01:03

## 2021-07-26 ASSESSMENT — PAIN DESCRIPTION - PAIN TYPE: TYPE: ACUTE PAIN

## 2021-07-26 ASSESSMENT — PAIN SCALES - GENERAL: PAIN_LEVEL: 0

## 2021-07-26 NOTE — ANESTHESIA PREPROCEDURE EVALUATION
24 yo  40 wk oliveros w/GDM and distress due to labor    Relevant Problems   ANESTHESIA (within normal limits)      NEURO   (positive) Nonintractable episodic headache       Physical Exam    Airway   Mallampati: II  TM distance: >3 FB  Neck ROM: full       Cardiovascular    Dental - normal exam           Pulmonary    Abdominal    Neurological - normal exam                 Anesthesia Plan    ASA 2       Plan - epidural   Neuraxial block will be labor analgesia                  Pertinent diagnostic labs and testing reviewed    Informed Consent:    Anesthetic plan and risks discussed with patient.

## 2021-07-26 NOTE — PROGRESS NOTES
"Labor Progress Note    Jenna Moyer   39w6d/A2DM      Subjective:  Pt s/p 2 doses of cytotec and now on pitocin since 7:30 pm. Pt with minimal cramps.  Uterine contractions:yes  Pain: Yes. Severity: mild    Objective:   Vitals:    07/25/21 0859 07/25/21 1257 07/25/21 1414 07/25/21 1907   BP:  121/72 135/84 157/74   Pulse:  64 73 65   Resp:  16  16   Temp:  36.3 °C (97.3 °F) 36.6 °C (97.9 °F) 36.7 °C (98 °F)   TempSrc:  Temporal Temporal Temporal   SpO2:       Weight: 65.8 kg (145 lb)      Height: 1.6 m (5' 3\")        Fetal heart variability:140's category 1  [place]: q 2-3  SVE: 2/50/-2, arom, clear, iupc placed     Membranes ruptured: .yes, clear    Meds:   Pitocin: .yes, 3 mu    Labs:  Recent Results (from the past 24 hour(s))   Hold Blood Bank Specimen (Not Tested)    Collection Time: 07/25/21  9:15 AM   Result Value Ref Range    Holding Tube - Bb DONE    CBC WITH DIFFERENTIAL    Collection Time: 07/25/21  9:15 AM   Result Value Ref Range    WBC 6.8 4.8 - 10.8 K/uL    RBC 4.01 (L) 4.20 - 5.40 M/uL    Hemoglobin 12.6 12.0 - 16.0 g/dL    Hematocrit 37.1 37.0 - 47.0 %    MCV 92.5 81.4 - 97.8 fL    MCH 31.4 27.0 - 33.0 pg    MCHC 34.0 33.6 - 35.0 g/dL    RDW 43.9 35.9 - 50.0 fL    Platelet Count 177 164 - 446 K/uL    MPV 12.7 9.0 - 12.9 fL    Neutrophils-Polys 66.00 44.00 - 72.00 %    Lymphocytes 24.60 22.00 - 41.00 %    Monocytes 8.20 0.00 - 13.40 %    Eosinophils 0.40 0.00 - 6.90 %    Basophils 0.40 0.00 - 1.80 %    Immature Granulocytes 0.40 0.00 - 0.90 %    Nucleated RBC 0.00 /100 WBC    Neutrophils (Absolute) 4.48 2.00 - 7.15 K/uL    Lymphs (Absolute) 1.67 1.00 - 4.80 K/uL    Monos (Absolute) 0.56 0.00 - 0.85 K/uL    Eos (Absolute) 0.03 0.00 - 0.51 K/uL    Baso (Absolute) 0.03 0.00 - 0.12 K/uL    Immature Granulocytes (abs) 0.03 0.00 - 0.11 K/uL    NRBC (Absolute) 0.00 K/uL   PREGNANCY INDUCED HYPERTENSION PROFILE-CBC W/O, BUN, CREATININE, AST, URIC ACID    Collection Time: 07/25/21  9:15 AM   Result Value Ref " Range    WBC 6.8 4.8 - 10.8 K/uL    RBC 3.98 (L) 4.20 - 5.40 M/uL    Hemoglobin 12.5 12.0 - 16.0 g/dL    Hematocrit 37.0 37.0 - 47.0 %    MCV 93.0 81.4 - 97.8 fL    MCH 31.4 27.0 - 33.0 pg    MCHC 33.8 33.6 - 35.0 g/dL    RDW 43.8 35.9 - 50.0 fL    Platelet Count 181 164 - 446 K/uL    MPV 12.7 9.0 - 12.9 fL    Bun 7 (L) 8 - 22 mg/dL    Creatinine 0.59 0.50 - 1.40 mg/dL    AST(SGOT) 32 12 - 45 U/L    Uric Acid 7.8 1.9 - 8.2 mg/dL   ESTIMATED GFR    Collection Time: 21  9:15 AM   Result Value Ref Range    GFR If African American >60 >60 mL/min/1.73 m 2    GFR If Non African American >60 >60 mL/min/1.73 m 2   SARS-COV Antigen CHAMP: Collect dry nasal swab    Collection Time: 21  9:36 AM   Result Value Ref Range    SARS-CoV-2 Source Nasal Swab     SARS-COV ANTIGEN CHAMP NotDetected Not-Detected   POCT glucose device results    Collection Time: 21 10:41 AM   Result Value Ref Range    Glucose - Accu-Ck 78 65 - 99 mg/dL   POCT glucose device results    Collection Time: 21  2:49 PM   Result Value Ref Range    Glucose - Accu-Ck 84 65 - 99 mg/dL   POCT glucose device results    Collection Time: 21  6:40 PM   Result Value Ref Range    Glucose - Accu-Ck 71 65 - 99 mg/dL       Assessment:   39w6d/A2DM- pt progressing. Pt on pitocin, s/p arom. Expt .  A2DM- check FSBS now and q 4 hours in latent labor. Pt with stable blood sugars.   GBBS- positive- Pt s/p 2nd dose of ampicillin per protocol.  Fetal status-reassuring      Cheryl Hernandez M.D.

## 2021-07-26 NOTE — PROGRESS NOTES
Labor Progress Note    Jenna Moyer   40w0d      Subjective:  Pt comfortable with epidural.  Uterine contractions:yes  Pain: No    Objective:   Vitals:    07/26/21 0449 07/26/21 0505 07/26/21 0530 07/26/21 0549   BP: 109/60  131/78 125/79   Pulse: (!) 57  (!) 55 68   Resp:       Temp:  36.8 °C (98.3 °F)     TempSrc:  Temporal     SpO2:       Weight:       Height:         Fetal heart variability: 150's category 1  [place]; q 3-4  SVE: 5/80/-1  Membranes ruptured: .yes, clear, 10:36 pm    Meds:   Epidural : .yes  Pitocin: off  Labs:  Recent Results (from the past 24 hour(s))   Hold Blood Bank Specimen (Not Tested)    Collection Time: 07/25/21  9:15 AM   Result Value Ref Range    Holding Tube - Bb DONE    CBC WITH DIFFERENTIAL    Collection Time: 07/25/21  9:15 AM   Result Value Ref Range    WBC 6.8 4.8 - 10.8 K/uL    RBC 4.01 (L) 4.20 - 5.40 M/uL    Hemoglobin 12.6 12.0 - 16.0 g/dL    Hematocrit 37.1 37.0 - 47.0 %    MCV 92.5 81.4 - 97.8 fL    MCH 31.4 27.0 - 33.0 pg    MCHC 34.0 33.6 - 35.0 g/dL    RDW 43.9 35.9 - 50.0 fL    Platelet Count 177 164 - 446 K/uL    MPV 12.7 9.0 - 12.9 fL    Neutrophils-Polys 66.00 44.00 - 72.00 %    Lymphocytes 24.60 22.00 - 41.00 %    Monocytes 8.20 0.00 - 13.40 %    Eosinophils 0.40 0.00 - 6.90 %    Basophils 0.40 0.00 - 1.80 %    Immature Granulocytes 0.40 0.00 - 0.90 %    Nucleated RBC 0.00 /100 WBC    Neutrophils (Absolute) 4.48 2.00 - 7.15 K/uL    Lymphs (Absolute) 1.67 1.00 - 4.80 K/uL    Monos (Absolute) 0.56 0.00 - 0.85 K/uL    Eos (Absolute) 0.03 0.00 - 0.51 K/uL    Baso (Absolute) 0.03 0.00 - 0.12 K/uL    Immature Granulocytes (abs) 0.03 0.00 - 0.11 K/uL    NRBC (Absolute) 0.00 K/uL   PREGNANCY INDUCED HYPERTENSION PROFILE-CBC W/O, BUN, CREATININE, AST, URIC ACID    Collection Time: 07/25/21  9:15 AM   Result Value Ref Range    WBC 6.8 4.8 - 10.8 K/uL    RBC 3.98 (L) 4.20 - 5.40 M/uL    Hemoglobin 12.5 12.0 - 16.0 g/dL    Hematocrit 37.0 37.0 - 47.0 %    MCV 93.0 81.4 - 97.8  fL    MCH 31.4 27.0 - 33.0 pg    MCHC 33.8 33.6 - 35.0 g/dL    RDW 43.8 35.9 - 50.0 fL    Platelet Count 181 164 - 446 K/uL    MPV 12.7 9.0 - 12.9 fL    Bun 7 (L) 8 - 22 mg/dL    Creatinine 0.59 0.50 - 1.40 mg/dL    AST(SGOT) 32 12 - 45 U/L    Uric Acid 7.8 1.9 - 8.2 mg/dL   ESTIMATED GFR    Collection Time: 21  9:15 AM   Result Value Ref Range    GFR If African American >60 >60 mL/min/1.73 m 2    GFR If Non African American >60 >60 mL/min/1.73 m 2   SARS-COV Antigen CHAMP: Collect dry nasal swab    Collection Time: 21  9:36 AM   Result Value Ref Range    SARS-CoV-2 Source Nasal Swab     SARS-COV ANTIGEN CHAMP NotDetected Not-Detected   POCT glucose device results    Collection Time: 21 10:41 AM   Result Value Ref Range    Glucose - Accu-Ck 78 65 - 99 mg/dL   POCT glucose device results    Collection Time: 21  2:49 PM   Result Value Ref Range    Glucose - Accu-Ck 84 65 - 99 mg/dL   POCT glucose device results    Collection Time: 21  6:40 PM   Result Value Ref Range    Glucose - Accu-Ck 71 65 - 99 mg/dL   POCT glucose device results    Collection Time: 21 10:43 PM   Result Value Ref Range    Glucose - Accu-Ck 89 65 - 99 mg/dL   POCT glucose device results    Collection Time: 21  2:18 AM   Result Value Ref Range    Glucose - Accu-Ck 102 (H) 65 - 99 mg/dL   POCT glucose device results    Collection Time: 21  5:07 AM   Result Value Ref Range    Glucose - Accu-Ck 94 65 - 99 mg/dL       Assessment:   40w0d/IOL/active labor-pt progressing, expt .  2-A2DM-  FSBS q 2 hours in labor. Current 94. Pt with stable blood sugars.   4- GBBS positive- Pt s/p 5th dose of ampicillin per protocol. Pt afebrile. CPM.   5- Fetal status-reassuring        Cheryl Hernandez M.D.

## 2021-07-26 NOTE — H&P
DATE OF ADMISSION:  2021     CHIEF COMPLAINT:  1.  Induction of labor at 39 weeks and 6 days.  2.  A2 diabetes.     HISTORY OF PRESENT ILLNESS:  This patient is a 25-year-old  1, para 0    female with a due date of 2021 based on first trimester   ultrasound, who has had gestational diabetes, with insulin, controlled.  The   patient has had an unstable fetal lie and was scheduled for a    section; however, she had an ultrasound on Friday and it showed that the fetus   was in the vertex presentation.  At that time, Dr. Muñoz, recommended induction   of labor.  She was scheduled for induction of labor on Saturday, but labor   and delivery was full and therefore she was brought in on .  When she   arrived on  morning, she had no complaints.  She has been having good   fetal movement, no contractions, no leaking of fluid.  She was closed, thick   and high.  Cervical ripening was started with Cytotec after I reviewed with   the patient the risks, benefits, indications and alternatives to induction of   labor.     PAST MEDICAL HISTORY:  A2 diabetes.     PAST SURGICAL HISTORY:  [place] teeth extraction, 2017.     MEDICINES:  1.  She is on prenatal vitamins.  2.  She is on insulin 8 units NPH.     ALLERGIES:  No known drug allergies.     OBSTETRICAL HISTORY:  She is a  1, para 0.     GYNECOLOGIC HISTORY:  The patient started menstruating at age 13, has   menstrual cycles every 28 days, lasted about 5 days.  Her last menstrual cycle   was on 10/07/2020.  No history of STDs.  No history of HSV.     SOCIAL HISTORY:  The patient is .  She denies tobacco, alcohol or drug   use.     PHYSICAL EXAMINATION:    VITAL SIGNS:  Stable.  She is afebrile with a blood pressure of 138/87, heart   rate 89, respirations 18, temperature 36.6.  GENERAL:  Pleasant female in no acute distress.  LUNGS:  Clear to auscultation bilaterally.  CARDIOVASCULAR:  Regular rate and rhythm, no  murmur.  ABDOMEN:  Soft, gravid.  [place]'s to 7 pounds.  EXTREMITIES:  No calf tenderness.  PELVIC:  Fetal heart tones 130s, category 1.  [place] none.  Sterile vaginal exam   closed, thick and high, for Cytotec 25 mcg per vagina was placed at 9:31 a.m.  EXTREMITIES:  No calf tenderness.     PRENATAL LABORATORY DATA:  GBS is positive.  One-hour glucose abnormal at 150,   3-hour glucose tolerance test abnormal, fasting glucose 80, 1-hour 189, 2   hours 165, 3-hour 144.  RPR is nonreactive.  MSAFP is negative.  Hepatitis B   surface antigen is negative, rubella immune, hepatitis C is negative.  RPR   nonreactive.  She is A positive, antibody screen negative.  Cystic fibrosis   screen negative.  Pap smear is negative.  GC and chlamydia is negative.    Carrier screen for cystic fibrosis is negative.  Fragile X is negative and SMA   is negative.     ASSESSMENT AND PLAN:  A 25-year-old  1, para 0 at 39 weeks and 6 days   by ultrasound in the first trimester.  1.  The patient with a non-favorable cervix, is closed, thick and high.  At   this time, we will start induction of labor with cervical ripening with   Cytotec.  We will proceed with Cytotec every 4-6 hours as needed and will   expect a normal spontaneous vaginal delivery.  2.  A2 diabetes.  The patient took her insulin NPH 8 units on Saturday night.    Her fingerstick blood sugars will be checked every 4 hours in latent labor   and every 2 hours in labor.  The patient has been having good control with no   evidence of macrosomia by Dr. Muñoz's latest ultrasound.  3.  GBS positive.  The patient is afebrile, was started on ampicillin per GBS   protocol.  4.  Fetal status is reassuring.        ______________________________  CA LOPEZ MD    RONALDO/GERMAN/ELODIA    DD:  2021 03:44  DT:  2021 04:18    Job#:  [id number]    CC:MD JANEY PRATT MD

## 2021-07-26 NOTE — PROGRESS NOTES
0700 received report from Steffanie NAVARRETE. POC discussed. Patient resting comfortably with epidural and peanut ball.  0920 Complete   0944 Patient pushing every other contraction due to fetal decelerations.  1009 Called Dr. Tijerina   1024 Dr. Tijerina at bedside  1032  baby girl 8/9 apgars

## 2021-07-26 NOTE — PROGRESS NOTES
notified of recurrent late decelerations and prolong. Orders for amnioinfusion and 1 dose of terbutaline if decelerations continue. Pt updated on POC.

## 2021-07-26 NOTE — ANESTHESIA PROCEDURE NOTES
Epidural Block    Date/Time: 7/26/2021 1:03 AM  Performed by: Nini Jamison M.D.  Authorized by: Nini Jamison M.D.     Patient Location:  OB  Start Time:  7/26/2021 1:03 AM  End Time:  7/26/2021 1:10 AM  Reason for Block: labor analgesia    patient identified, IV checked, site marked, risks and benefits discussed, surgical consent, monitors and equipment checked, pre-op evaluation and timeout performed    Patient Position:  Sitting  Prep: ChloraPrep, patient draped and sterile technique    Monitoring:  Blood pressure, continuous pulse oximetry and heart rate  Approach:  Midline  Location:  L3-L4  Injection Technique:  EDGARD saline  Skin infiltration:  Lidocaine  Strength:  1%  Dose:  3ml  Needle Type:  Tuohy  Needle Gauge:  17 G  Needle Length:  3.5 in  Loss of resistance::  5  Catheter Size:  19 G  Catheter at Skin Depth:  9  Test Dose Result:  Negative  Events: paresthesia-transient/resolved    Events comment:  Right sided   Patient more comfortable after epidural

## 2021-07-26 NOTE — PROGRESS NOTES
S:  Pt comfortable with epidural    O: VSS - afebrile  FHTs 140s Cat 1-11  [place]: Q 2-4  Cx: 9/100/+1  BS - normal  Pit 3  Amp    A/P:  Iup at 40 weeks, IOL for GDMA2, IUGR - doing well  1.  Labor;  Recheck in 1 hour and start second stage if complete  2.  FWB: overall reassuring

## 2021-07-26 NOTE — ANESTHESIA TIME REPORT
Anesthesia Start and Stop Event Times     Date Time Event    7/26/2021 0101 Ready for Procedure     0101 Anesthesia Start     1032 Anesthesia Stop        Responsible Staff  07/26/21    Name Role Begin End    Nini Jamison M.D. Anesth 0101 0704    Darcy Persaud M.D. Anesth 0704 1032        Preop Diagnosis (Free Text):  Pre-op Diagnosis             Preop Diagnosis (Codes):    Post op Diagnosis  Pregnancy      Premium Reason  Non-Premium    Comments:

## 2021-07-26 NOTE — PROGRESS NOTES
Labor Progress Note    Jenna Moyer   40w0d      Subjective:  Pt sleeping. Pt comfortable with epidural.  Uterine contractions:yes  Pain: No    Objective:   Vitals:    07/26/21 0151 07/26/21 0210 07/26/21 0230 07/26/21 0250   BP: 113/58 107/54 123/88 114/58   Pulse: (!) 53 (!) 54 (!) 58 (!) 59   Resp:       Temp:    36.6 °C (97.8 °F)   TempSrc:    Temporal   SpO2:       Weight:       Height:         FHT: 150's category II, occ decels with return to baseline with contraction, positive long term variablilty  [place]: q 3 min  SVE: 2-3/70/-2    Membranes ruptured: .yes, arom at 10:36 am    Meds:   Epidural : yes  Pitocin: off  Labs:  Recent Results (from the past 24 hour(s))   Hold Blood Bank Specimen (Not Tested)    Collection Time: 07/25/21  9:15 AM   Result Value Ref Range    Holding Tube - Bb DONE    CBC WITH DIFFERENTIAL    Collection Time: 07/25/21  9:15 AM   Result Value Ref Range    WBC 6.8 4.8 - 10.8 K/uL    RBC 4.01 (L) 4.20 - 5.40 M/uL    Hemoglobin 12.6 12.0 - 16.0 g/dL    Hematocrit 37.1 37.0 - 47.0 %    MCV 92.5 81.4 - 97.8 fL    MCH 31.4 27.0 - 33.0 pg    MCHC 34.0 33.6 - 35.0 g/dL    RDW 43.9 35.9 - 50.0 fL    Platelet Count 177 164 - 446 K/uL    MPV 12.7 9.0 - 12.9 fL    Neutrophils-Polys 66.00 44.00 - 72.00 %    Lymphocytes 24.60 22.00 - 41.00 %    Monocytes 8.20 0.00 - 13.40 %    Eosinophils 0.40 0.00 - 6.90 %    Basophils 0.40 0.00 - 1.80 %    Immature Granulocytes 0.40 0.00 - 0.90 %    Nucleated RBC 0.00 /100 WBC    Neutrophils (Absolute) 4.48 2.00 - 7.15 K/uL    Lymphs (Absolute) 1.67 1.00 - 4.80 K/uL    Monos (Absolute) 0.56 0.00 - 0.85 K/uL    Eos (Absolute) 0.03 0.00 - 0.51 K/uL    Baso (Absolute) 0.03 0.00 - 0.12 K/uL    Immature Granulocytes (abs) 0.03 0.00 - 0.11 K/uL    NRBC (Absolute) 0.00 K/uL   PREGNANCY INDUCED HYPERTENSION PROFILE-CBC W/O, BUN, CREATININE, AST, URIC ACID    Collection Time: 07/25/21  9:15 AM   Result Value Ref Range    WBC 6.8 4.8 - 10.8 K/uL    RBC 3.98 (L) 4.20 -  5.40 M/uL    Hemoglobin 12.5 12.0 - 16.0 g/dL    Hematocrit 37.0 37.0 - 47.0 %    MCV 93.0 81.4 - 97.8 fL    MCH 31.4 27.0 - 33.0 pg    MCHC 33.8 33.6 - 35.0 g/dL    RDW 43.8 35.9 - 50.0 fL    Platelet Count 181 164 - 446 K/uL    MPV 12.7 9.0 - 12.9 fL    Bun 7 (L) 8 - 22 mg/dL    Creatinine 0.59 0.50 - 1.40 mg/dL    AST(SGOT) 32 12 - 45 U/L    Uric Acid 7.8 1.9 - 8.2 mg/dL   ESTIMATED GFR    Collection Time: 21  9:15 AM   Result Value Ref Range    GFR If African American >60 >60 mL/min/1.73 m 2    GFR If Non African American >60 >60 mL/min/1.73 m 2   SARS-COV Antigen CHAMP: Collect dry nasal swab    Collection Time: 21  9:36 AM   Result Value Ref Range    SARS-CoV-2 Source Nasal Swab     SARS-COV ANTIGEN CHAMP NotDetected Not-Detected   POCT glucose device results    Collection Time: 21 10:41 AM   Result Value Ref Range    Glucose - Accu-Ck 78 65 - 99 mg/dL   POCT glucose device results    Collection Time: 21  2:49 PM   Result Value Ref Range    Glucose - Accu-Ck 84 65 - 99 mg/dL   POCT glucose device results    Collection Time: 21  6:40 PM   Result Value Ref Range    Glucose - Accu-Ck 71 65 - 99 mg/dL   POCT glucose device results    Collection Time: 21 10:43 PM   Result Value Ref Range    Glucose - Accu-Ck 89 65 - 99 mg/dL   POCT glucose device results    Collection Time: 21  2:18 AM   Result Value Ref Range    Glucose - Accu-Ck 102 (H) 65 - 99 mg/dL       Assessment:   40w0d/IOL- pt now in early labor and progressing. Expect .  2-fetal decels- now improved with d/c pitocin, IVF bolus , 500 ml amnioinfusion. CPM.  3-A2DM-  FSBS q 2 hours in labor. Current 102. Pt with stable blood sugars.   4- GBBS positive- Pt s/p 4th dose of ampicillin per protocol. Pt afebrile. CPM.   5- Fetal status-reassuring        Cheryl Hernandez M.D.

## 2021-07-26 NOTE — CARE PLAN
Problem: Risk for Infection and Impaired Wound Healing  Goal: Patient will remain free from infection  Outcome: Progressing   Hand hygiene performed in and out of room. Discussed proper hand hygiene with patient.    Problem: Pain  Goal: Patient's pain will be alleviated or reduced to the patient’s comfort goal  Outcome: Progressing  Patient is resting comfortably with epidural.     Problem: Knowledge Deficit - L&D  Goal: Patient and family/caregivers will demonstrate understanding of plan of care, disease process/condition, diagnostic tests and medications  Outcome: Progressing  POC discussed with patient and partner Kendrick. Explanations given for interventions taken.      The patient is Stable - Low risk of patient condition declining or worsening

## 2021-07-26 NOTE — PROGRESS NOTES
1900- Received report from KAYLIE Alan RN. Assumed care of pt. POC discussed- will start pitocin. Pt denies questions or needs at this time. Encouraged to call with needs. Will monitor.

## 2021-07-26 NOTE — CARE PLAN
Problem: Risk for Injury  Goal: Patient and fetus will be free of preventable injury/complications  Outcome: Progressing  Note: Recurrent decelerations and decrease in variability noted after epidural. Pt repositioned, fluid bolus initiated, amnioinfusion performed.  aware, reviewing tracing. Increase in variability noted, decelerations resolved.     Problem: Pain  Goal: Patient's pain will be alleviated or reduced to the patient’s comfort goal  Flowsheets (Taken 7/26/2021 8192)  OB Pain Level: 0-No Pain  Note: Pt desired epidural for pain management. Dr. Jamison placed epidural without difficulty. Pt states adequate pain relief.

## 2021-07-26 NOTE — ANESTHESIA POSTPROCEDURE EVALUATION
Patient: Jenna Moyer    Procedure Summary     Date: 07/26/21 Room / Location:     Anesthesia Start: 0101 Anesthesia Stop: 1032    Procedure: Labor Epidural Diagnosis:     Scheduled Providers:  Responsible Provider: Darcy Persaud M.D.    Anesthesia Type: epidural ASA Status: 2          Final Anesthesia Type: epidural  Last vitals  BP   Blood Pressure: 137/74    Temp   36.2 °C (97.2 °F)    Pulse   (!) 49   Resp   16    SpO2   94 %      Anesthesia Post Evaluation    Patient location during evaluation: PACU  Patient participation: complete - patient participated  Level of consciousness: awake and alert  Pain score: 0    Airway patency: patent  Anesthetic complications: no  Cardiovascular status: hemodynamically stable  Respiratory status: acceptable  Hydration status: euvolemic    PONV: none    patient able to participate, but full recovery from regional anesthesia has not occurred and is not expected within the stipulated timeframe for the completion of the evaluation      No complications documented.

## 2021-07-26 NOTE — L&D DELIVERY NOTE
DATE OF SERVICE:  2021     This is a 25-year-old  1, para 0 who presented to labor and delivery   yesterday for induction of labor secondary to suspected intrauterine growth   restriction and gestational diabetes mellitus type A1.  She was admitted to   labor and delivery at 39 and 6/7th weeks.  She was given two doses of   misoprostol.  She underwent artificial rupture of membranes for clear fluid   and had an intrauterine pressure catheter placed.  She received an epidural   for pain management.  She has also started on ampicillin for group B strep   positive status.  Her blood sugars were checked during labor and she did not   require any insulin during labor.  Throughout labor, fetal heart tones were   mostly category 1 tracing.  She progressed to complete and pushed for   approximately one hour and on 2021 at 40 weeks, she delivered the head   over an intact perineum.  Mouth and nose were bulb suctioned and there was no   evidence of a nuchal cord.  The rest of the baby delivered easily with the   next push.  This is a viable female infant, weight 6 pounds 10 ounces, Apgars 8 and 9,   delivered at 1032 hours on 2021 at 40 weeks.  The infant was placed on   the mother's abdomen crying vigorously.  There was delayed cord clamping for 1   minute.  The cord was then clamped twice and cut and the placenta delivered   spontaneously intact with a 3-vessel cord at 1036 hours.  Twenty units of IV   Pitocin, fundal massage and bimanual massage provided good uterine   contraction.  Estimated blood loss was 200 mL.  Inspection of the cervix   revealed no lacerations.  Inspection of the perineum revealed a small left   sulcus tear.  This was repaired with 2-0 Vicryl in the usual sterile fashion.    Mother and infant are stable.        ______________________________  MD SUNNY PRATT/MARINA    DD:  2021 10:56  DT:  2021 11:10    Job#:  [id number]

## 2021-07-27 VITALS
TEMPERATURE: 98.2 F | DIASTOLIC BLOOD PRESSURE: 80 MMHG | WEIGHT: 145 LBS | BODY MASS INDEX: 25.69 KG/M2 | OXYGEN SATURATION: 98 % | HEIGHT: 63 IN | HEART RATE: 64 BPM | SYSTOLIC BLOOD PRESSURE: 123 MMHG | RESPIRATION RATE: 16 BRPM

## 2021-07-27 PROCEDURE — A9270 NON-COVERED ITEM OR SERVICE: HCPCS | Performed by: OBSTETRICS & GYNECOLOGY

## 2021-07-27 PROCEDURE — 700102 HCHG RX REV CODE 250 W/ 637 OVERRIDE(OP): Performed by: OBSTETRICS & GYNECOLOGY

## 2021-07-27 RX ADMIN — PRENATAL WITH FERROUS FUM AND FOLIC ACID 1 TABLET: 3080; 920; 120; 400; 22; 1.84; 3; 20; 10; 1; 12; 200; 27; 25; 2 TABLET ORAL at 07:57

## 2021-07-27 RX ADMIN — IBUPROFEN 600 MG: 600 TABLET, FILM COATED ORAL at 07:57

## 2021-07-27 ASSESSMENT — EDINBURGH POSTNATAL DEPRESSION SCALE (EPDS)
THE THOUGHT OF HARMING MYSELF HAS OCCURRED TO ME: NEVER
THINGS HAVE BEEN GETTING ON TOP OF ME: NO, I HAVE BEEN COPING AS WELL AS EVER
I HAVE LOOKED FORWARD WITH ENJOYMENT TO THINGS: AS MUCH AS I EVER DID
I HAVE BEEN ANXIOUS OR WORRIED FOR NO GOOD REASON: HARDLY EVER
I HAVE BLAMED MYSELF UNNECESSARILY WHEN THINGS WENT WRONG: NOT VERY OFTEN
I HAVE BEEN SO UNHAPPY THAT I HAVE BEEN CRYING: NO, NEVER
I HAVE FELT SCARED OR PANICKY FOR NO GOOD REASON: NO, NOT MUCH
I HAVE BEEN SO UNHAPPY THAT I HAVE HAD DIFFICULTY SLEEPING: NOT AT ALL
I HAVE FELT SAD OR MISERABLE: NO, NOT AT ALL
I HAVE BEEN ABLE TO LAUGH AND SEE THE FUNNY SIDE OF THINGS: AS MUCH AS I ALWAYS COULD

## 2021-07-27 ASSESSMENT — PAIN DESCRIPTION - PAIN TYPE
TYPE: ACUTE PAIN
TYPE: ACUTE PAIN

## 2021-07-27 NOTE — PROGRESS NOTES
Pt arrived to [facility] via wheelchair with L&D RN. Report received from Zita Coats. Assessment completed. Denies pain at this time. Pt oriented to room, call light, infant security, emergency light, and unit routine. Encouraged to call for assistance.

## 2021-07-27 NOTE — PROGRESS NOTES
0800 Assessment completed, fundus firm, lochia light, POC reviewed, verbalized understanding. Denies pain at this time, will call if pain med intervention needed.     1100 Discharge instructions reviewed, verbalized understanding, paper signed, reviewed, verbalized understanding.     1205 MOB left facility, escorted by nursing staff

## 2021-07-27 NOTE — PROGRESS NOTES
2030 Assessment completed. Lochia light, fundus firm. Plan of care reviewed. Declines pain intervention at this time, will call if pain intervention needed.

## 2021-07-27 NOTE — CARE PLAN
The patient is Stable - Low risk of patient condition declining or worsening    Shift Goals  Clinical Goals: Patient will maintain stable VS; achieve latch of 8; control pain and bleeding  Patient Goals: Shower; remove IV  Family Goals: Work on breastfeeding    Progress made toward(s) clinical / shift goals:    Problem: Knowledge Deficit - L&D  Goal: Patient and family/caregivers will demonstrate understanding of plan of care, disease process/condition, diagnostic tests and medications  Outcome: Progressing     Problem: Risk for Infection and Impaired Wound Healing  Goal: Patient will remain free from infection  Outcome: Progressing  Goal: Patient's wound/surgical incision will decrease in size and heals properly  Outcome: Progressing     Problem: Psychosocial - L&D  Goal: Patient's level of anxiety will decrease  Outcome: Progressing  Goal: Patient will be able to discuss coping skills during hospitalization  Outcome: Progressing  Goal: Patient's ability to re-evaluate and adapt role responsibilities will improve  Outcome: Progressing  Goal: Spiritual and cultural needs incorporated into hospitalization  Outcome: Progressing     Problem: Risk for Venous Thromboembolism (VTE)  Goal: VTE prevention measures will be implemented and patient will remain free from VTE  Outcome: Progressing     Problem: Risk for Fluid Imbalance  Goal: Patient's fluid volume balance will be maintained or improve  Outcome: Progressing     Problem: Risk for Injury  Goal: Patient and fetus will be free of preventable injury/complications  Outcome: Progressing     Problem: Pain  Goal: Patient's pain will be alleviated or reduced to the patient’s comfort goal  Outcome: Progressing     Problem: Discharge Barriers/Planning  Goal: Patient's continuum of care needs are met  Outcome: Progressing     Problem: Knowledge Deficit - Standard  Goal: Patient and family/care givers will demonstrate understanding of plan of care, disease process/condition,  diagnostic tests and medications  Outcome: Progressing     Problem: Knowledge Deficit - Postpartum  Goal: Patient will verbalize and demonstrate understanding of self and infant care  Outcome: Progressing     Problem: Psychosocial - Postpartum  Goal: Patient will verbalize and demonstrate effective bonding and parenting behavior  Outcome: Progressing     Problem: Altered Physiologic Condition  Goal: Patient physiologically stable as evidenced by normal lochia, palpable uterine involution and vitals within normal limits  Outcome: Progressing     Problem: Infection - Postpartum  Goal: Postpartum patient will be free of signs and symptoms of infection  Outcome: Progressing     Problem: Pain - Standard  Goal: Alleviation of pain or a reduction in pain to the patient’s comfort goal  Outcome: Progressing       Patient is not progressing towards the following goals:

## 2021-07-27 NOTE — LACTATION NOTE
This note was copied from a baby's chart.  Mother reports that she is breastfeeding her  without difficulty or discomfort. Encouraged her to call her RN for a latch assessment. Resources for out-patient support discussed.

## 2021-07-27 NOTE — DISCHARGE PLANNING
Discharge Planning Assessment Post Partum    Reason for Referral:  Consult-history of anxiety and depression  Address: [address] [place] [address] [place] [address]  Phone: [phone]  Type of Living Situation: living with FOB  Mom Diagnosis: Pregnancy  Baby Diagnosis: [place]-40 weeks  Primary Language: English    Name of Baby: Kaylyn Moyer (: 21)  Father of the Baby: Kendrick Moyer  Involved in baby’s care? Yes  Contact Information: [phone]    Prenatal Care: Yes  Mom's PCP: Fanny Rodriguez PA-C  PCP for new baby: Dr. Colletti    Support System: FOB and MOB states she has a large support system with her family  Coping/Bonding between mother & baby: Yes  Source of Feeding: breast feeding  Supplies for Infant: prepared for infant; denies any needs    Mom's Insurance: [employer]  Baby Covered on Insurance:Yes  Mother Employed/School: [employer]  Other children in the home/names & ages: No, first baby    Financial Hardship/Income: No   Mom's Mental status: alert and oriented  Services used prior to admit: None    CPS History: No  Psychiatric History: history of anxiety and depression.  Discussed with mother and provided a list of counseling resources specializing in maternal mental health  Domestic Violence History: No  Drug/ETOH History: No    Resources Provided: post partum support and counseling resources provided to mother  Referrals Made: None     Clearance for Discharge: Infant is cleared to discharge home with parents

## 2021-07-27 NOTE — PROGRESS NOTES
PP day#1    S: Pt doing well.  Minimal lochia.  Breast feeding. No problems voiding. OTC Motrin for pain.     O: T 97.5 P 56  /60  ABD: Soft, NT. FF below umb  EXT: no cords or Homans  H/H   A+  GBS +    A/P:  PP day #1 S/P  at term, GBS +, GDMA2 - doing well  1.  D/C homeif baby released  2.  OTC Motrin for pain  3.  Pelvic rest for 6 weeks   4. Needs 2 hour 75 gram OGTT 8 weeks pp

## 2021-07-27 NOTE — CARE PLAN
Problem: Knowledge Deficit - L&D  Goal: Patient and family/caregivers will demonstrate understanding of plan of care, disease process/condition, diagnostic tests and medications  Outcome: Met     Problem: Risk for Infection and Impaired Wound Healing  Goal: Patient will remain free from infection  Outcome: Met  Goal: Patient's wound/surgical incision will decrease in size and heals properly  Outcome: Met     Problem: Psychosocial - L&D  Goal: Patient's level of anxiety will decrease  Outcome: Met  Goal: Patient will be able to discuss coping skills during hospitalization  Outcome: Met  Goal: Patient's ability to re-evaluate and adapt role responsibilities will improve  Outcome: Met  Goal: Spiritual and cultural needs incorporated into hospitalization  Outcome: Met     Problem: Risk for Venous Thromboembolism (VTE)  Goal: VTE prevention measures will be implemented and patient will remain free from VTE  Outcome: Met     Problem: Risk for Fluid Imbalance  Goal: Patient's fluid volume balance will be maintained or improve  Outcome: Met     Problem: Risk for Injury  Goal: Patient and fetus will be free of preventable injury/complications  Outcome: Met     Problem: Pain  Goal: Patient's pain will be alleviated or reduced to the patient’s comfort goal  Outcome: Met     Problem: Discharge Barriers/Planning  Goal: Patient's continuum of care needs are met  Outcome: Met     Problem: Knowledge Deficit - Standard  Goal: Patient and family/care givers will demonstrate understanding of plan of care, disease process/condition, diagnostic tests and medications  Outcome: Met     Problem: Knowledge Deficit - Postpartum  Goal: Patient will verbalize and demonstrate understanding of self and infant care  Outcome: Met     Problem: Psychosocial - Postpartum  Goal: Patient will verbalize and demonstrate effective bonding and parenting behavior  Outcome: Met     Problem: Altered Physiologic Condition  Goal: Patient physiologically  stable as evidenced by normal lochia, palpable uterine involution and vitals within normal limits  Outcome: Met     Problem: Infection - Postpartum  Goal: Postpartum patient will be free of signs and symptoms of infection  Outcome: Met     Problem: Pain - Standard  Goal: Alleviation of pain or a reduction in pain to the patient’s comfort goal  Outcome: Met   The patient is Stable - Low risk of patient condition declining or worsening    Shift Goals  Clinical Goals:  (dc home today)  Patient Goals: Shower; remove IV  Family Goals: Work on breastfeeding    Progress made toward(s) clinical / shift goals:  discharged today    Patient is not progressing towards the following goals:

## 2021-07-27 NOTE — DISCHARGE INSTRUCTIONS
PATIENT DISCHARGE EDUCATION INSTRUCTION SHEET  REASONS TO CALL YOUR OBSTETRICIAN  · Persistent fever, shaking, chills (Temperature higher than 100.4) may indicate you have an infection  · Heavy bleeding: soaking more than 1 pad per hour; Passing clots an egg-sized clot or bigger may mean you have an postpartum hemorrhage  · Foul odor from vagina or bad smelling or discolored discharge or blood  · Breast infection (Mastitis symptoms); breast pain, chills, fever, redness or red streaks, may feel flu like symptoms  · Urinary pain, burning or frequency  · Incision that is not healing, increased redness, swelling, tenderness or pain, or any pus from episiotomy or  site may mean you have an infection  · Redness, swelling, warmth, or painful to touch in the calf area of your leg may mean you have a blood clot  · Severe or intensified depression, thoughts or feelings of wanting to hurt yourself or someone else   · Pain in chest, obstructed breathing or shortness of breath (trouble catching your breath) may mean you are having a postpartum complication. Call your provider immediately   · Headache that does not get better, even after taking medicine, a bad headache with vision changes or pain in the upper right area of your belly may mean you have high blood pressure or post birth preeclampsia. Call your provider immediately    HAND WASHING  All family and friends should wash their hands:  · Before and after holding the baby  · Before feeding the baby  · After using the restroom or changing the baby's diaper    WOUND CARE  Ask your physician for additional care instructions. In general:  ·  Incision:  · May shower and pat incision dry   · Keep the incision clean and dry  · There should not be any opening or pus from the incision  · Continue to walk at home 3 times a day   · Do NOT lift anything heavier than your baby (over 10 pounds)  · Encourage family to help participate in care of the  to allow  rest and mom time to heal  · Episiotomy/Laceration  · May use shaka-spray bottle, witch hazel pads and dermaplast spray for comfort  · Use shaka-spray bottle after urinating to cleanse perineal area  · To prevent burning during urination spray shaka-water bottle on labial area   · Pat perineal area dry until episiotomy/laceration is healed  · Continue to use shaka-bottle until bleeding stops as needed  · If have a 2nd degree laceration or greater, a Sitz bath can offer relief from soreness, burning, and inflammation   · Sitz Bath   · Sit in 6 inches of warm water and soak laceration as needed until the laceration heals    VAGINAL CARE AND BLEEDING  · Nothing inside vagina for 6 weeks:   · No sexual intercourse, tampons or douching  · Bleeding may continue for 2-4 weeks. Amount and color may vary  · Soaking 1 pad or more in an hour for several hours is considered heavy bleeding  · Passing large egg sized blood clots can be concerning  · If you feel like you have heavy bleeding or are having increasing amount of blood clots call your Obstetrician immediately  · If you begin feeling faint upon standing, feeling sick to your stomach, have clammy skin, a really fast heartbeat, have chills, start feeling confused, dizzy, sleepy or weak, or feeling like you're going to faint call your Obstetrician immediately    HYPERTENSION   Preeclampsia or gestational hypertension are types of high blood pressure that only pregnant women can get. It is important for you to be aware of symptoms to seek early intervention and treatment. If you have any of these symptoms immediately call your Obstetrician    · Vision changes or blurred vision   · Severe headache or pain that is unrelieved with medication and will not go away  · Persistent pain in upper abdomen or shoulder   · Increased swelling of face, feet, or hands  · Difficulty breathing or shortness of breath at rest  · Urinating less than usual    URINATION AND BOWEL MOVEMENTS  · Eating  "more fiber (bran cereal, fruits, and vegetables) and drinking plenty of fluids will help to avoid constipation  · Urinary frequency and urgency after childbirth is normal  · If you experience any urinary pain, burning or frequency call your provider    BIRTH CONTROL  · It is possible to become pregnant at any time after delivery and while breastfeeding  · Plan to discuss a method of birth control with your physician at your post delivery follow up visit    POSTPARTUM BLUES  During the first few days after birth, you may experience a sense of the \"blues\" which may include impatience, irritability or even crying. These feelings come and go quickly. However, as many as 1 in 10 women experience emotional symptoms known as postpartum depression.     POSTPARTUM DEPRESSION    May start as early as the second or third day after delivery or take several weeks or months to develop. Symptoms of \"blues\" are present, but are more intense: Crying spells; loss of appetite; feelings of hopelessness or loss of control; fear of touching the baby; over concern or no concern at all about the baby; little or no concern about your own appearance/caring for yourself; and/or inability to sleep or excessive sleeping. Contact your Obstetrician if you are experiencing any of these symptoms     PREVENTING SHAKEN BABY  If you are angry or stressed, PUT THE BABY IN THE CRIB, step away, take some deep breaths, and wait until you are calm to care for the baby. DO NOT SHAKE THE BABY. You are not alone, call a supporter for help.  · [facility] 24/7 crisis call line (233-663-6623) or (1-866.209.5221)  · You can also text them, text \"ANSWER\" (280271)      "

## 2021-09-07 ENCOUNTER — HOSPITAL ENCOUNTER (OUTPATIENT)
Dept: LAB | Facility: MEDICAL CENTER | Age: 26
End: 2021-09-07
Attending: OBSTETRICS & GYNECOLOGY
Payer: COMMERCIAL

## 2021-09-07 PROCEDURE — 87491 CHLMYD TRACH DNA AMP PROBE: CPT

## 2021-09-07 PROCEDURE — 87591 N.GONORRHOEAE DNA AMP PROB: CPT

## 2021-09-07 PROCEDURE — 88175 CYTOPATH C/V AUTO FLUID REDO: CPT

## 2022-09-29 ENCOUNTER — OFFICE VISIT (OUTPATIENT)
Dept: URGENT CARE | Facility: PHYSICIAN GROUP | Age: 27
End: 2022-09-29
Payer: COMMERCIAL

## 2022-09-29 VITALS
TEMPERATURE: 98 F | SYSTOLIC BLOOD PRESSURE: 130 MMHG | BODY MASS INDEX: 23.55 KG/M2 | WEIGHT: 128 LBS | HEIGHT: 62 IN | RESPIRATION RATE: 16 BRPM | OXYGEN SATURATION: 95 % | HEART RATE: 105 BPM | DIASTOLIC BLOOD PRESSURE: 78 MMHG

## 2022-09-29 DIAGNOSIS — J01.90 ACUTE NON-RECURRENT SINUSITIS, UNSPECIFIED LOCATION: ICD-10-CM

## 2022-09-29 PROCEDURE — 99213 OFFICE O/P EST LOW 20 MIN: CPT | Performed by: PHYSICIAN ASSISTANT

## 2022-09-29 RX ORDER — AMOXICILLIN AND CLAVULANATE POTASSIUM 875; 125 MG/1; MG/1
1 TABLET, FILM COATED ORAL 2 TIMES DAILY
Qty: 14 TABLET | Refills: 0 | Status: SHIPPED | OUTPATIENT
Start: 2022-09-29 | End: 2022-10-06

## 2022-09-29 ASSESSMENT — ENCOUNTER SYMPTOMS
FEVER: 0
DIARRHEA: 0
SORE THROAT: 1
SINUS PAIN: 1
SHORTNESS OF BREATH: 0
NAUSEA: 0
HEADACHES: 1
COUGH: 1
MYALGIAS: 0
SINUS PRESSURE: 1
VOMITING: 0
EYE REDNESS: 0
EYE DISCHARGE: 0

## 2022-09-29 NOTE — PROGRESS NOTES
"Subjective     Jenna Moyer is a 27 y.o. female who presents with Sinusitis ([id number] )            Sinusitis  This is a new problem. Episode onset: x 10 days ago. The problem is unchanged. There has been no fever. The pain is mild. Associated symptoms include congestion, coughing, headaches (secondary to sinus pressure), sinus pressure and a sore throat (now resolved). Pertinent negatives include no ear pain or shortness of breath. Past treatments include nothing.     The patient states her family was recently sick with URI-like symptoms.  The patient reports no known exposure to COVID-19.    PMH:  has a past medical history of Anxiety and Diabetes ([facility]).    She has no past medical history of Asthma, Blood clotting disorder ([facility]), Gynecological disorder, Hemorrhagic disorder ([facility]), Hypertension, Seizure ([facility]), or Stroke ([facility]).  MEDS: No current outpatient medications on file.  ALLERGIES: No Known Allergies  SURGHX:   Past Surgical History:   Procedure Laterality Date    OTHER      wisdom teeth     SOCHX:  reports that she has never smoked. She has never used smokeless tobacco. She reports that she does not currently use alcohol after a past usage of about 0.6 oz per week. She reports that she does not use drugs.  FH: Family history was reviewed, no pertinent findings to report    Review of Systems   Constitutional:  Negative for fever.   HENT:  Positive for congestion, sinus pressure, sinus pain and sore throat (now resolved). Negative for ear pain.    Eyes:  Negative for discharge and redness.   Respiratory:  Positive for cough. Negative for shortness of breath.    Cardiovascular:  Negative for chest pain.   Gastrointestinal:  Negative for diarrhea, nausea and vomiting.   Musculoskeletal:  Negative for myalgias.   Neurological:  Positive for headaches (secondary to sinus pressure).            Objective     /78   Pulse (!) 105   Temp 36.7 °C (98 °F) (Temporal)   Resp 16   Ht 1.575 m (5' 2\")   Wt 58.1 " kg (128 lb)   SpO2 95%   BMI 23.41 kg/m²      Physical Exam  Constitutional:       General: She is not in acute distress.     Appearance: Normal appearance.   HENT:      Head: Normocephalic and atraumatic.      Right Ear: Tympanic membrane, ear canal and external ear normal.      Left Ear: Tympanic membrane, ear canal and external ear normal.      Nose: Nose normal.      Mouth/Throat:      Mouth: Mucous membranes are moist.      Pharynx: Oropharynx is clear. No posterior oropharyngeal erythema.   Eyes:      Extraocular Movements: Extraocular movements intact.      Conjunctiva/sclera: Conjunctivae normal.   Cardiovascular:      Rate and Rhythm: Normal rate and regular rhythm.      Heart sounds: Normal heart sounds.   Pulmonary:      Effort: Pulmonary effort is normal. No respiratory distress.      Breath sounds: Normal breath sounds. No wheezing.   Musculoskeletal:         General: Normal range of motion.      Cervical back: Normal range of motion and neck supple.   Skin:     General: Skin is warm and dry.   Neurological:      Mental Status: She is alert and oriented to person, place, and time.                           Assessment & Plan          1. Acute non-recurrent sinusitis, unspecified location  - amoxicillin-clavulanate (AUGMENTIN) 875-125 MG Tab; Take 1 Tablet by mouth 2 times a day for 7 days.  Dispense: 14 Tablet; Refill: 0    Differential diagnoses, supportive care, and indications for immediate follow-up discussed with patient.   Instructed to return to clinic or nearest emergency department for any change in condition, further concerns, or worsening of symptoms.      OTC Tylenol or Motrin for fever/discomfort.  OTC cough/cold medication for symptomatic relief  OTC oral decongestants for symptomatic relief  OTC antihistamines for symptomatic relief  OTC Flonase for symptomatic relief  OTC Supportive Care for Congestion - saline nasal spray or neti pot  Drink plenty of fluids  Follow-up with PCP  Return  to clinic or go to the ED if symptoms worsen or fail to improve, or if the patient should develop worsening/increasing sinus pain/pressure, congestion, ear pain, sore throat, headache, cough, shortness of breath, wheezing, chest pain, fever/chills, and/or any concerning symptoms.    Discussed plan with the patient, and she agrees to the above.     I personally reviewed prior external notes and test results pertinent to today's visit.  I have independently reviewed and interpreted all diagnostics ordered during this urgent care visit.     Please note that this dictation was created using voice recognition software. I have made every reasonable attempt to correct obvious errors, but I expect that there may be errors of grammar and possibly content that I did not discover before finalizing the note.     This note was electronically signed by Kasey Vee PA-C

## 2022-12-29 ENCOUNTER — TELEMEDICINE (OUTPATIENT)
Dept: BEHAVIORAL HEALTH | Facility: CLINIC | Age: 27
End: 2022-12-29
Payer: COMMERCIAL

## 2022-12-29 DIAGNOSIS — F41.1 GAD (GENERALIZED ANXIETY DISORDER): ICD-10-CM

## 2022-12-29 PROCEDURE — 90791 PSYCH DIAGNOSTIC EVALUATION: CPT | Performed by: SOCIAL WORKER
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
## 2023-02-21 ENCOUNTER — OFFICE VISIT (OUTPATIENT)
Dept: BEHAVIORAL HEALTH | Facility: CLINIC | Age: 28
End: 2023-02-21
Payer: COMMERCIAL

## 2023-02-21 DIAGNOSIS — F41.1 GAD (GENERALIZED ANXIETY DISORDER): ICD-10-CM

## 2023-02-21 NOTE — PROGRESS NOTES
[facility] Therapy Progress Note      Therapy was provided on this date in coordination with the [facility] approved Clinical Supervisor under the direct supervision of  who was on site during this visit.    Therapist reviewed informed consent, limits of confidentiality and [facility] policies; patient expressed understanding and agreed to voluntarily proceed with evaluation and treatment.    Name: Jenna Moyer  MRN: [id number]  : 1995  Age: 27 y.o.  Date of assessment: 2023  PCP: Fanny Rodriguez P.A.-C.  Persons in attendance: Patient and LCSWi Tita Lovelace  Total session time: 50 minutes      Topics addressed in psychotherapy include:     Data: Client appeared neat and oriented. Client reported history w/spouse, cyclic arguments and history of familial relationships. Client reports need to compete. Client reports visuals of hierarchies in her life.This section includes observable data, like screenings and patient statements.    Assessment: Client used much of the session to continue offering historic information. Client and therapist discussed possible origins of competition. Client and therapist discussed future communication training. Client agreed to engage in activity of being present and mindful and not engaging in competition. Client will utilize “validate, then share” idea for conversations with spouse. Client and therapist to discuss MIL and boundaries at next visit.    Plan: Therapist provided supportive psychotherapy. Plan of care is to utilize CBT.     Objective Observations:   Participation:Active verbal participation   Grooming:Neat   Cognition:Alert and Fully Oriented   Eye Contact:Good   Mood:Euthymic   Affect:Congruent with content   Thought Process:Logical and Goal-directed   Speech:Rate within normal limits and Volume within normal limits    Current Risk:   Suicide:  Not indicated   Homicide:  Not indicated   Self-Harm:  Not indicated     Relapse:  Not indicated    Safety Plan Reviewed: not applicable        Diagnosis:  1. NIRU (generalized anxiety disorder)        Referral appointment(s) scheduled? Yes       Tita Lovelace

## 2023-03-07 ENCOUNTER — TELEMEDICINE (OUTPATIENT)
Dept: BEHAVIORAL HEALTH | Facility: CLINIC | Age: 28
End: 2023-03-07
Payer: COMMERCIAL

## 2023-03-07 DIAGNOSIS — F41.1 GAD (GENERALIZED ANXIETY DISORDER): ICD-10-CM

## 2023-03-07 PROCEDURE — 90834 PSYTX W PT 45 MINUTES: CPT | Mod: MISDOCU

## 2023-03-07 NOTE — PROGRESS NOTES
"[facility]   Therapy Progress Note    Therapy was provided on this date in coordination with the [facility] approved Clinical Supervisor under the direct supervision of  who was on site during this visit.     Therapist reviewed informed consent, limits of confidentiality and [facility] policies; patient expressed understanding and agreed to voluntarily proceed with evaluation and treatment.    This visit was conducted via Zoom using secure and encrypted videoconferencing technology.  The patient was in a private location in the [facility].  The patient's identity was confirmed and verbal consent was obtained for this virtual visit.  Place of Service: POS 10 -The patient is at Home during their visit in the [facility].    Name: Jenna Moyer  MRN: [id number]  : 1995  Age: 27 y.o.  Date of assessment: 3/7/2023  PCP: Fanny Rodriguez P.A.-C.  Persons in attendance: Patient and Janeth Lovelace  Total session time: 50 minutes    Topics addressed in psychotherapy include:     Data: Client appeared oriented and alert. Client reports that she believes she has a sensory disorder. Client reports issues w/clothes, chewing/oral stimulation, sensitive to sounds, texture issues, and overstimulation with crowds. Client states \"I am on the spectrum somewhere.\" Client reports quick to anger and \"snap reactions.\" Client reports main goal is to solidify marriage and regulate emotion.     Assessment: Client responded well to support and validation. Client and therapist discussed boundaries, familial relationships and expectations she has of her marriage. Client agreed to engage in boundary setting language \"I am making repairs and in order to do that I need to set boundaries to feel safe.\" Client agreed to meeting a goal of a small interaction w/in laws after discussion w/.     Plan: Therapist provided supportive psychotherapy. Plan of care is to utilize CBT. "     Objective Observations:   Participation:Active verbal participation   Grooming:Casual   Cognition:Alert and Fully Oriented   Eye Contact:Good   Mood:Euthymic   Affect:Congruent with content   Thought Process:Logical and Goal-directed   Speech:Rate within normal limits and Volume within normal limits    Current Risk:   Suicide:  Not indicated   Homicide:  Not indicated   Self-Harm:  Not indicated   Relapse:  Not indicated   Safety Plan Reviewed: not applicable        Diagnosis:  1. NIRU (generalized anxiety disorder)              Tita Lovelace

## 2023-03-21 ENCOUNTER — OFFICE VISIT (OUTPATIENT)
Dept: BEHAVIORAL HEALTH | Facility: CLINIC | Age: 28
End: 2023-03-21
Payer: COMMERCIAL

## 2023-03-21 DIAGNOSIS — F41.1 GAD (GENERALIZED ANXIETY DISORDER): ICD-10-CM

## 2023-03-21 PROCEDURE — 90834 PSYTX W PT 45 MINUTES: CPT | Mod: MISDOCU

## 2023-03-21 ASSESSMENT — PATIENT HEALTH QUESTIONNAIRE - PHQ9
4. FEELING TIRED OR HAVING LITTLE ENERGY: NOT AT ALL
7. TROUBLE CONCENTRATING ON THINGS, SUCH AS READING THE NEWSPAPER OR WATCHING TELEVISION: NOT AT ALL
5. POOR APPETITE OR OVEREATING: NOT AT ALL
3. TROUBLE FALLING OR STAYING ASLEEP OR SLEEPING TOO MUCH: NOT AT ALL
SUM OF ALL RESPONSES TO PHQ9 QUESTIONS 1 AND 2: 0
1. LITTLE INTEREST OR PLEASURE IN DOING THINGS: NOT AT ALL
9. THOUGHTS THAT YOU WOULD BE BETTER OFF DEAD, OR OF HURTING YOURSELF: NOT AT ALL
SUM OF ALL RESPONSES TO PHQ QUESTIONS 1-9: 0
2. FEELING DOWN, DEPRESSED, IRRITABLE, OR HOPELESS: NOT AT ALL
8. MOVING OR SPEAKING SO SLOWLY THAT OTHER PEOPLE COULD HAVE NOTICED. OR THE OPPOSITE, BEING SO FIGETY OR RESTLESS THAT YOU HAVE BEEN MOVING AROUND A LOT MORE THAN USUAL: NOT AT ALL
6. FEELING BAD ABOUT YOURSELF - OR THAT YOU ARE A FAILURE OR HAVE LET YOURSELF OR YOUR FAMILY DOWN: NOT AL ALL

## 2023-03-21 NOTE — PROGRESS NOTES
[facility] Therapy Progress Note      Therapy was provided on this date in coordination with the [facility] approved Clinical Supervisor under the direct supervision of  who was on site during this visit.    Therapist reviewed informed consent, limits of confidentiality and [facility] policies; patient expressed understanding and agreed to voluntarily proceed with evaluation and treatment.    Name: Jenna Moyer  MRN: [id number]  : 1995  Age: 27 y.o.  Date of assessment: 3/21/2023  PCP: Fanny Rodriguez P.A.-C.  Persons in attendance: Patient and Janeth Lovelace  Total session time: 50 minutes      Topics addressed in psychotherapy include:     Data: Client appeared oriented and alert. Client reports  is grieving. Client reports difficulty in offering support. Client reports history of familial emotions and displays of emotions.     Depression Screening    Little interest or pleasure in doing things?   Not at all  Feeling down, depressed , or hopeless?  Not at all  Trouble falling or staying asleep, or sleeping too much?   Not at all  Feeling tired or having little energy?   Not at all  Poor appetite or overeating?   Not at all  Feeling bad about yourself - or that you are a failure or have let yourself or your family down?  Not al all  Trouble concentrating on things, such as reading the newspaper or watching television?  Not at all  Moving or speaking so slowly that other people could have noticed.  Or the opposite - being so fidgety or restless that you have been moving around a lot more than usual?   Not at all  Thoughts that you would be better off dead, or of hurting yourself?   Not at all  Patient Health Questionnaire Score:  0      If depressive symptoms identified deferred to follow up visit unless specifically addressed in assesment and plan.    Interpretation of PHQ-9 Total Score   Score Severity   1-4 No Depression   5-9 Mild Depression  "  10-14 Moderate Depression   15-19 Moderately Severe Depression   20-27 Severe Depression      Assessment: Client and therapist discussed grief and the differences in humans and their grieving styles. Client was receptive to ways to support spouse during grieving. Client engaged in a self-reflective activity and was able to identify ways in which she can improve on communication. Client completed homework from last session successfully. Client and therapist discussed different communication styles I.e. \"advice or listening?\" Client to continue to decide what boundaries look like for her before setting them. Client is very agreeable to interventions utilized.     Plan: Therapist provided supportive psychotherapy. Plan of care is to utilize CBT.     Objective Observations:   Participation:Active verbal participation   Grooming:Neat   Cognition:Alert and Fully Oriented   Eye Contact:Good   Mood:Euthymic   Affect:Congruent with content   Thought Process:Logical and Goal-directed   Speech:Rate within normal limits and Volume within normal limits    Current Risk:   Suicide:  Not indicated   Homicide:  Not indicated   Self-Harm:  Not indicated    Relapse:  Not indicated    Safety Plan Reviewed: not applicable        Diagnosis:  1. NIRU (generalized anxiety disorder)            Tita Lovelace    "

## 2023-04-04 ENCOUNTER — TELEMEDICINE (OUTPATIENT)
Dept: BEHAVIORAL HEALTH | Facility: CLINIC | Age: 28
End: 2023-04-04
Payer: COMMERCIAL

## 2023-04-04 DIAGNOSIS — F41.1 GAD (GENERALIZED ANXIETY DISORDER): ICD-10-CM

## 2023-04-04 PROCEDURE — 90834 PSYTX W PT 45 MINUTES: CPT | Mod: MISDOCU
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
## 2023-04-18 ENCOUNTER — OFFICE VISIT (OUTPATIENT)
Dept: BEHAVIORAL HEALTH | Facility: CLINIC | Age: 28
End: 2023-04-18
Payer: COMMERCIAL

## 2023-04-18 DIAGNOSIS — F41.1 GAD (GENERALIZED ANXIETY DISORDER): ICD-10-CM

## 2023-04-18 NOTE — PROGRESS NOTES
"[facility]   Therapy Progress Note      Therapy was provided on this date in coordination with the [facility] approved Clinical Supervisor under the direct supervision of  who was on site during this visit.    Therapist reviewed informed consent, limits of confidentiality and [facility] policies; patient expressed understanding and agreed to voluntarily proceed with evaluation and treatment.    Name: Jenna Moyer  MRN: [id number]  : 1995  Age: 27 y.o.  Date of assessment: 2023  PCP: Fanny Rodriguez P.A.-C.  Persons in attendance: Patient and LCSJodee Lovelace  Total session time: 50 minutes      Topics addressed in psychotherapy include:     Data: Client appeared alert and oriented. Client reports self-reflection and trying to be more mindful in conversations with . Client reports continued worried about hypothetical situations. Client recounts goals she has met this week and continued work on her communication skills.    Assessment: Client and therapist discussed scenarios of conversations and different dialogue for responses. Client and therapist discussed catastrophizing, client to work on being present in the now \"why worry about August when it's April.\" Client demonstrated an understanding in changing thought processes and how to divert attention elsewhere. Client and therapist did progress goal review. Client is responding favorably to treatment plan.     Plan: Therapist provided supportive psychotherapy. Plan of care is to utilize CBT.     Objective Observations:   Participation:Active verbal participation, Attentive, Engaged, and Open to feedback   Grooming:Casual   Cognition:Alert and Fully Oriented   Eye Contact:Good   Mood:Euthymic and Happy   Affect:Congruent with content   Thought Process:Logical   Speech:Rate within normal limits and Volume within normal limits    Current Risk:   Suicide:  Not indicated   Homicide:  Not " indicated   Self-Harm:  Not indicated    Relapse:  Not indicated    Safety Plan Reviewed: not applicable      Diagnosis:  1. NIRU (generalized anxiety disorder)            Tita Lovelace

## 2023-05-02 ENCOUNTER — OFFICE VISIT (OUTPATIENT)
Dept: BEHAVIORAL HEALTH | Facility: CLINIC | Age: 28
End: 2023-05-02
Payer: COMMERCIAL

## 2023-05-02 DIAGNOSIS — F41.1 GAD (GENERALIZED ANXIETY DISORDER): ICD-10-CM

## 2023-05-02 NOTE — PROGRESS NOTES
[facility] Therapy Progress Note      Therapy was provided on this date in coordination with the [facility] approved Clinical Supervisor under the direct supervision of  who was on site during this visit.    Therapist reviewed informed consent, limits of confidentiality and [facility] policies; patient expressed understanding and agreed to voluntarily proceed with evaluation and treatment.    Name: Jenna Moyer  MRN: [id number]  : 1995  Age: 27 y.o.  Date of assessment: 2023  PCP: Fanny Rodriguez P.A.-C.  Persons in attendance: Patient and LCSWi Tita Lovelace  Total session time: 50 minutes      Topics addressed in psychotherapy include:     Data: Client appeared alert and oriented. Client reports several wins for the last week within her marriage and communication. Client reports continuing to work towards responses over reactions and effectively communicating what she needs, wants and expects vs allowing conflict to manifest.    Assessment: Client and therapist discussed client's wins, client is demonstrating a deep understanding of language and dialogue and utilizing it to diffuse vs ignite conflict. Client and therapist discussed client's progress and ways to continue to work towards her goal. Client demonstrated the ability to stop catastrophizing and become present. Client and therapist discussed stretching appts and client will decide. Next session to discuss body language as client's affect bothers her.     Plan: Therapist provided supportive psychotherapy. Plan of care is to utilize CBT.     Objective Observations:   Participation:Active verbal participation, Engaged, and Open to feedback   Grooming:Casual   Cognition:Alert and Fully Oriented   Eye Contact:Good   Mood:Euthymic   Affect:Happy   Thought Process:Logical and Goal-directed   Speech:Rate within normal limits and Volume within normal limits    Current Risk:   Suicide:  Not  indicated   Homicide:  Not indicated   Self-Harm:  Not indicated    Relapse:  Not indicated    Safety Plan Reviewed: not applicable        Diagnosis:  1. NIRU (generalized anxiety disorder)            Tita Lovelace

## 2023-05-16 ENCOUNTER — OFFICE VISIT (OUTPATIENT)
Dept: BEHAVIORAL HEALTH | Facility: CLINIC | Age: 28
End: 2023-05-16
Payer: COMMERCIAL

## 2023-05-16 DIAGNOSIS — F41.1 GAD (GENERALIZED ANXIETY DISORDER): ICD-10-CM

## 2023-05-16 PROCEDURE — 90834 PSYTX W PT 45 MINUTES: CPT | Performed by: PSYCHIATRY & NEUROLOGY

## 2023-05-16 NOTE — PROGRESS NOTES
[facility] Progress Note      Therapy was provided on this date in coordination with the [facility] approved Clinical Supervisor under the direct supervision of  who was on site during this visit.    Therapist reviewed informed consent, limits of confidentiality and [facility] policies; patient expressed understanding and agreed to voluntarily proceed with evaluation and treatment.    Name: Jenna Moyer  MRN: [id number]  : 1995  Age: 27 y.o.  Date of assessment: 2023  PCP: Fanny Rodriguez P.A.-C.  Persons in attendance: Patient and LCSWi Tita Lovelace  Total session time: 50 minutes      Topics addressed in psychotherapy include:     Data: Client appeared alert and oriented. Client reports a recent argument with  regarding his drinking and behavior. Client reports this escalated over the weekend and she has now moved out to her sister's house while they work on separation. Client recounted the argument and events and discussed her responses and emotions.     Assessment: Client used most of the session to process events. Client demonstrated self-awareness and evaluation and has shown growth in this area as previously she would have negatively engaged. Client and therapist discussed safety and safety plans. Client has a strong support network and is utilizing those resources appropriately. Client responded favorably to validation and support.     Plan: Therapist provided supportive psychotherapy. Plan of care is to utilize CBT.        Objective Observations:   Participation:Active verbal participation, Attentive, Engaged, and Open to feedback   Grooming:Casual   Cognition:Alert and Fully Oriented   Eye Contact:Good   Mood:Euthymic   Affect:Expansive, Sad, and Tearful   Thought Process:Logical and Goal-directed   Speech:Rate within normal limits and Volume within normal limits    Current Risk:   Suicide:  Not indicated   Homicide:  Not  indicated   Self-Harm:  Not indicated    Relapse:  Not indicated    Safety Plan Reviewed: not applicable        Diagnosis:  1. NIRU (generalized anxiety disorder)            Tita Lovelace

## 2023-06-06 ENCOUNTER — OFFICE VISIT (OUTPATIENT)
Dept: BEHAVIORAL HEALTH | Facility: CLINIC | Age: 28
End: 2023-06-06
Payer: COMMERCIAL

## 2023-06-06 DIAGNOSIS — F41.1 GAD (GENERALIZED ANXIETY DISORDER): ICD-10-CM

## 2023-06-06 NOTE — PROGRESS NOTES
[facility] Progress Note      Therapy was provided on this date in coordination with the [facility] approved Clinical Supervisor under the direct supervision of  who was on site during this visit.    Therapist reviewed informed consent, limits of confidentiality and [facility] policies; patient expressed understanding and agreed to voluntarily proceed with evaluation and treatment.    Name: Jenna Moyer  MRN: [id number]  : 1995  Age: 27 y.o.  Date of assessment: 2023  PCP: Fanny Rodriguez P.A.-C.  Persons in attendance: Patient and LCSJodee Lovelace  Total session time: 50 minutes      Topics addressed in psychotherapy include:     Data: Client appeared alert and oriented. Client reports continued struggle with marital separation and most currently with sharing child. Client reports feeling like a burden to family whom she is relying on for support. Client reports struggling with self-confidence in choices. Client reports that she is avoiding major feelings but is allowing herself some space to process.     Assessment: Client and therapist discussed safety measures for separation, client will look into pursuing some type of [facility] schedule as well as a legal separation. Client and therapist revisited client's choice to leave and events leading up to it. Client and therapist discussed accepting help and not allowing intrusive thoughts to talk her out of it. Client and therapist discussed confidence in choices and trusting herself to make sound decisions. Client is wanting to be seen more frequently and was moved up on waitlist.     Plan: Therapist provided supportive psychotherapy. Plan of care is to utilize CBT    Objective Observations:   Participation:Active verbal participation, Attentive, Engaged, and Open to feedback   Grooming:Casual   Cognition:Alert and Fully Oriented   Eye Contact:Good   Mood:Euthymic   Affect:Blunted and  Flat   Thought Process:Logical   Speech:Rate within normal limits and Volume within normal limits    Current Risk:   Suicide:  Not indicated   Homicide:  Not indicated   Self-Harm:  Not indicated    Relapse:  Not indicated    Safety Plan Reviewed: not applicable        Diagnosis:  1. NIRU (generalized anxiety disorder)            Tita Lovelace

## 2023-06-15 ENCOUNTER — OFFICE VISIT (OUTPATIENT)
Dept: BEHAVIORAL HEALTH | Facility: CLINIC | Age: 28
End: 2023-06-15
Payer: COMMERCIAL

## 2023-06-15 DIAGNOSIS — F41.1 GAD (GENERALIZED ANXIETY DISORDER): ICD-10-CM

## 2023-06-15 PROCEDURE — 90834 PSYTX W PT 45 MINUTES: CPT | Mod: MISDOCU

## 2023-06-15 NOTE — PROGRESS NOTES
[facility] Progress Note      Therapy was provided on this date in coordination with the [facility] approved Clinical Supervisor under the direct supervision of  who was on site during this visit.    Therapist reviewed informed consent, limits of confidentiality and [facility] policies; patient expressed understanding and agreed to voluntarily proceed with evaluation and treatment.    Name: Jenna Moyer  MRN: [id number]  : 1995  Age: 27 y.o.  Date of assessment: 6/15/2023  PCP: Fanny Rodriguez P.A.-C.  Persons in attendance: Patient and LCSWi Tita Lovelace  Total session time: 50 minutes      Topics addressed in psychotherapy include:     Data: Client appeared alert and oriented. Client reports that emotionally she is feeling better this week. Client reports that she is maintaining her boundaries while  with ex- and is working hard to continue to hold him accountable. Client reports that she has started the legal separation process. Client reports that she is spending more time with friends and family and starting to engage in hobbies more than before. Client reports that she still has moments of sadness and loneliness but they are farther between.    Assessment: Client and therapist discussed boundaries, most of them in place to keep daughter safe. Historically client waffles with boundaries however she has maintained her stance and is determined to keep it strong. Client and therapist discussed rollercoasters of emotions and how there can still be lows. Client appeared much more emotionally stable today vs past sessions and continues to progress as desired.     Plan: Therapist provided supportive psychotherapy. Plan of care is to utilize CBT    Objective Observations:   Participation:Active verbal participation, Attentive, Engaged, and Open to feedback   Grooming:Casual   Cognition:Alert and Fully Oriented   Eye  Contact:Good   Mood:Happy   Affect:Congruent with content   Thought Process:Logical and Goal-directed   Speech:Rate within normal limits and Volume within normal limits    Current Risk:   Suicide:  Not indicated   Homicide:  Not indicated   Self-Harm:  Not indicated    Relapse:  Not indicated    Safety Plan Reviewed: not applicable        Diagnosis:  1. NIRU (generalized anxiety disorder)            Tita Lovelace

## 2023-06-20 ENCOUNTER — OFFICE VISIT (OUTPATIENT)
Dept: BEHAVIORAL HEALTH | Facility: CLINIC | Age: 28
End: 2023-06-20
Payer: COMMERCIAL

## 2023-06-20 DIAGNOSIS — F41.1 GAD (GENERALIZED ANXIETY DISORDER): ICD-10-CM

## 2023-06-20 PROCEDURE — 90834 PSYTX W PT 45 MINUTES: CPT

## 2023-06-20 NOTE — PROGRESS NOTES
[facility] Progress Note      Therapy was provided on this date in coordination with the [facility] approved Clinical Supervisor under the direct supervision of  who was on site during this visit.    Therapist reviewed informed consent, limits of confidentiality and [facility] policies; patient expressed understanding and agreed to voluntarily proceed with evaluation and treatment.    Name: Jenna Moyer  MRN: [id number]  : 1995  Age: 27 y.o.  Date of assessment: 2023  PCP: Fanny Rodriguez P.A.-C.  Persons in attendance: Patient and LCSWi Titaghassan Lovelace  Total session time: 50 minutes      Topics addressed in psychotherapy include:     Data: Client appeared alert and oriented. Client reports that she is continuing to feel better and heal from her split w/. Client reports that she is working towards becoming more autonomous and self-aware. Client reports that she is trying to pay attention to other connections and relationships she has, specifically with family. Client reports that she is going to be seeing a family member this weekend to make amends.     Assessment: Client and therapist discussed recent developments with separation, although client has not filed any paperwork she has been ensuring her financial stability and creating healthy boundaries w/ex- regarding communication. Client engaged in communication skills training, particularly in regards to family member she is addressing this weekend. Client and therapist discussed care vs control and self-evaluation on how much is too much when it comes to probing for information. Client responded well to support and encouragement.     Plan: Therapist provided supportive psychotherapy. Plan of care is to utilize CBT    Objective Observations:   Participation:Active verbal participation, Attentive, Engaged, and Open to feedback   Grooming:Casual   Cognition:Alert and Fully  Oriented   Eye Contact:Good   Mood:Euthymic   Affect:Happy   Thought Process:Logical and Goal-directed   Speech:Rate within normal limits and Volume within normal limits    Current Risk:   Suicide:  Not indicated   Homicide:  Not indicated   Self-Harm:  Not indicated    Relapse:  Not indicated    Safety Plan Reviewed: not applicable        Diagnosis:  1. NIRU (generalized anxiety disorder)            Tita Lovelace

## 2023-07-05 ENCOUNTER — OFFICE VISIT (OUTPATIENT)
Dept: BEHAVIORAL HEALTH | Facility: CLINIC | Age: 28
End: 2023-07-05
Payer: COMMERCIAL

## 2023-07-05 DIAGNOSIS — F41.1 GAD (GENERALIZED ANXIETY DISORDER): ICD-10-CM

## 2023-07-05 PROCEDURE — 90834 PSYTX W PT 45 MINUTES: CPT

## 2023-07-05 NOTE — PROGRESS NOTES
[facility] Progress Note      Therapy was provided on this date in coordination with the [facility] approved Clinical Supervisor under the direct supervision of  who was on site during this visit.    Therapist reviewed informed consent, limits of confidentiality and [facility] policies; patient expressed understanding and agreed to voluntarily proceed with evaluation and treatment.    Name: Jenna Moyer  MRN: [id number]  : 1995  Age: 27 y.o.  Date of assessment: 2023  PCP: Fanny Rodriguez P.A.-C.  Persons in attendance: Patient and LCSJodee Lovelace  Total session time: 50 minutes      Topics addressed in psychotherapy include:     Data: Client appeared alert and oriented. Client reports several instances this week in which she used her newfound effective communication skills in a way that benefited her and her social/familial connections. Client reports working to establish sense of self beyond roles she had been in before. Client reports struggling with interpreting others words/actions beyond what they are saying and feeling a need to respond to them as such. Client reports starting family therapy with the intention of learning to co-parent.     Assessment: Client and therapist discussed effective communication further, client was given tools for deeper understanding (I.e. boundary setting context and further ways to rephrase and reframe questions). Client and therapist discussed roles and continuing to look for her independence. Client and therapist discussed believing others in what they say and understanding that reading others emotions is not her responsibility. Client and therapist discussed client's intentions for family therapy and ways to set clear goals.     Plan: Therapist provided supportive psychotherapy. Plan of care is to utilize CBT    Objective Observations:   Participation:Active verbal participation, Attentive, Engaged, and  Open to feedback   Grooming:Casual   Cognition:Alert and Fully Oriented   Eye Contact:Good   Mood:Euthymic   Affect:Congruent with content   Thought Process:Logical   Speech:Rate within normal limits and Volume within normal limits    Current Risk:   Suicide:  Not indicated   Homicide:  Not indicated   Self-Harm:  Not indicated    Relapse:  Not indicated    Safety Plan Reviewed: not applicable        Diagnosis:  1. NIRU (generalized anxiety disorder)            Tita Lovelace

## 2023-07-25 ENCOUNTER — OFFICE VISIT (OUTPATIENT)
Dept: BEHAVIORAL HEALTH | Facility: CLINIC | Age: 28
End: 2023-07-25
Payer: COMMERCIAL

## 2023-07-25 DIAGNOSIS — F41.1 GAD (GENERALIZED ANXIETY DISORDER): ICD-10-CM

## 2023-07-25 PROCEDURE — 90834 PSYTX W PT 45 MINUTES: CPT

## 2023-07-25 NOTE — PROGRESS NOTES
[facility] Progress Note      Therapy was provided on this date in coordination with the [facility] approved Clinical Supervisor under the direct supervision of  who was on site during this visit.    Therapist reviewed informed consent, limits of confidentiality and [facility] policies; patient expressed understanding and agreed to voluntarily proceed with evaluation and treatment.    Name: Jenna Moyer  MRN: [id number]  : 1995  Age: 27 y.o.  Date of assessment: 2023  PCP: Fanny Rodriguez P.A.-C.  Persons in attendance: Patient and LCSWi Tita Lovelace  Total session time: 50 minutes      Topics addressed in psychotherapy include:     Data: Client appeared alert and oriented. Client reports seeing couples therapist w/estranged  and sharing her trajectory for the future. Client reports that she is going to move forward w/divorce and is starting to slowly separate their marital things. Client reports that she has been using her coping skills to emotionally regulate when talking w/estranged .     Assessment: Client and therapist discussed couple's session, client felt supported and heard during session and used her newfound communication skills to express her needs. Client and therapist discussed client's plan of action, client is secure in her safety and is looking towards financial security. Client and therapist engaged in an exploration of feelings, client appears to be coping and grieving in a healthy manner. Next session to refocus on previous therapeutic goals of exploring competitiveness and personality characteristics that bother client.     Plan: Therapist provided supportive psychotherapy. Plan of care is to utilize CBT    Objective Observations:   Participation:Active verbal participation, Attentive, Engaged, and Open to feedback   Grooming:Casual   Cognition:Alert and Fully Oriented   Eye  Contact:Good   Mood:Euthymic   Affect:Happy   Thought Process:Logical   Speech:Rate within normal limits and Volume within normal limits    Current Risk:   Suicide:  Not indicated   Homicide:  Not indicated   Self-Harm:  Not indicated    Relapse:  Not indicated    Safety Plan Reviewed: not applicable        Diagnosis:  1. NIRU (generalized anxiety disorder)            Tita Lovelace

## 2023-08-11 ENCOUNTER — APPOINTMENT (OUTPATIENT)
Dept: BEHAVIORAL HEALTH | Facility: CLINIC | Age: 28
End: 2023-08-11
Payer: COMMERCIAL

## 2023-08-18 ENCOUNTER — APPOINTMENT (OUTPATIENT)
Dept: BEHAVIORAL HEALTH | Facility: CLINIC | Age: 28
End: 2023-08-18
Payer: COMMERCIAL

## 2023-08-31 ENCOUNTER — APPOINTMENT (OUTPATIENT)
Dept: BEHAVIORAL HEALTH | Facility: CLINIC | Age: 28
End: 2023-08-31
Payer: COMMERCIAL

## 2023-09-26 ENCOUNTER — TELEMEDICINE (OUTPATIENT)
Dept: BEHAVIORAL HEALTH | Facility: CLINIC | Age: 28
End: 2023-09-26
Payer: COMMERCIAL

## 2023-09-26 DIAGNOSIS — F41.1 GAD (GENERALIZED ANXIETY DISORDER): ICD-10-CM

## 2023-09-26 PROCEDURE — 90834 PSYTX W PT 45 MINUTES: CPT | Mod: GT

## 2023-09-26 NOTE — PROGRESS NOTES
[facility] Progress Note    Therapy was provided on this date in coordination with the [facility] approved Clinical Supervisor under the direct supervision of  who was on site during this visit.     Therapist reviewed informed consent, limits of confidentiality and [facility] policies; patient expressed understanding and agreed to voluntarily proceed with evaluation and treatment.    This visit was conducted via Zoom using secure and encrypted videoconferencing technology.  The patient was in a private location in the [facility].  The patient's identity was confirmed and verbal consent was obtained for this virtual visit.  Place of Service: POS 10 -The patient is at Home during their visit in the [facility].    Name: Jenna Moyer  MRN: [id number]  : 1995  Age: 28 y.o.  Date of assessment: 2023  PCP: Fanny Rodriguez P.A.-C.  Persons in attendance: Patient and Janeth Lovelace  Total session time: 50 minutes    Topics addressed in psychotherapy include:     Data: Client appeared alert and oriented. Client reports co-parenting is going well and estranged  has started individual therapy as well as they are engaging in couple's therapy as well. Client reports having to put horse down a few weeks ago. Client reports some tension with sister and brother in law that she reports was resolved after conversation. Client reports she is still leaning towards getting formally . Client reports exploring career options.     Assessment: Client was able to work on emotional decompression. Client and therapist did an exploration of feelings on death of horse as well as conflict with family. Client and therapist discussed marriage in depth, client worked towards rational reasoning and discussed feelings and lessons she has been ruminating on. Client and therapist discussed altering expectations and allowing flexibility for the dynamic of  what her nuclear family looks like moving forward. Client responded well to support and unconditional regard.     Plan:Therapist provided supportive psychotherapy. Plan of care is to utilize CBT. Client is aware of this writer's upcoming leave and will opt to take a break until this writer's return.     Objective Observations:   Participation:Active verbal participation, Attentive, Engaged, and Open to feedback   Grooming:Casual   Cognition:Alert and Fully Oriented   Eye Contact:Limited   Mood:Euthymic   Affect:Congruent with content and Happy   Thought Process:Logical   Speech:Rate within normal limits and Volume within normal limits    Current Risk:   Suicide:  Not indicated   Homicide:  Not indicated   Self-Harm:  Not indicated   Relapse:  Not indicated   Safety Plan Reviewed: not applicable        Diagnosis:  1. NIRU (generalized anxiety disorder)              Tita Lovelace

## 2024-02-05 SDOH — HEALTH STABILITY: PHYSICAL HEALTH: ON AVERAGE, HOW MANY MINUTES DO YOU ENGAGE IN EXERCISE AT THIS LEVEL?: 30 MIN

## 2024-02-05 SDOH — ECONOMIC STABILITY: INCOME INSECURITY: HOW HARD IS IT FOR YOU TO PAY FOR THE VERY BASICS LIKE FOOD, HOUSING, MEDICAL CARE, AND HEATING?: NOT VERY HARD

## 2024-02-05 SDOH — ECONOMIC STABILITY: TRANSPORTATION INSECURITY
IN THE PAST 12 MONTHS, HAS THE LACK OF TRANSPORTATION KEPT YOU FROM MEDICAL APPOINTMENTS OR FROM GETTING MEDICATIONS?: NO

## 2024-02-05 SDOH — ECONOMIC STABILITY: HOUSING INSECURITY
IN THE LAST 12 MONTHS, WAS THERE A TIME WHEN YOU DID NOT HAVE A STEADY PLACE TO SLEEP OR SLEPT IN A SHELTER (INCLUDING NOW)?: NO

## 2024-02-05 SDOH — HEALTH STABILITY: PHYSICAL HEALTH: ON AVERAGE, HOW MANY DAYS PER WEEK DO YOU ENGAGE IN MODERATE TO STRENUOUS EXERCISE (LIKE A BRISK WALK)?: 4 DAYS

## 2024-02-05 SDOH — ECONOMIC STABILITY: TRANSPORTATION INSECURITY
IN THE PAST 12 MONTHS, HAS LACK OF RELIABLE TRANSPORTATION KEPT YOU FROM MEDICAL APPOINTMENTS, MEETINGS, WORK OR FROM GETTING THINGS NEEDED FOR DAILY LIVING?: NO

## 2024-02-05 SDOH — ECONOMIC STABILITY: HOUSING INSECURITY: IN THE LAST 12 MONTHS, HOW MANY PLACES HAVE YOU LIVED?: 1

## 2024-02-05 SDOH — ECONOMIC STABILITY: FOOD INSECURITY: WITHIN THE PAST 12 MONTHS, THE FOOD YOU BOUGHT JUST DIDN'T LAST AND YOU DIDN'T HAVE MONEY TO GET MORE.: NEVER TRUE

## 2024-02-05 SDOH — ECONOMIC STABILITY: INCOME INSECURITY: IN THE LAST 12 MONTHS, WAS THERE A TIME WHEN YOU WERE NOT ABLE TO PAY THE MORTGAGE OR RENT ON TIME?: NO

## 2024-02-05 SDOH — ECONOMIC STABILITY: FOOD INSECURITY: WITHIN THE PAST 12 MONTHS, YOU WORRIED THAT YOUR FOOD WOULD RUN OUT BEFORE YOU GOT MONEY TO BUY MORE.: NEVER TRUE

## 2024-02-05 SDOH — ECONOMIC STABILITY: TRANSPORTATION INSECURITY
IN THE PAST 12 MONTHS, HAS LACK OF TRANSPORTATION KEPT YOU FROM MEETINGS, WORK, OR FROM GETTING THINGS NEEDED FOR DAILY LIVING?: NO

## 2024-02-05 ASSESSMENT — SOCIAL DETERMINANTS OF HEALTH (SDOH)
HOW OFTEN DO YOU GET TOGETHER WITH FRIENDS OR RELATIVES?: ONCE A WEEK
HOW OFTEN DO YOU ATTEND CHURCH OR RELIGIOUS SERVICES?: MORE THAN 4 TIMES PER YEAR
IN A TYPICAL WEEK, HOW MANY TIMES DO YOU TALK ON THE PHONE WITH FAMILY, FRIENDS, OR NEIGHBORS?: MORE THAN THREE TIMES A WEEK
WITHIN THE PAST 12 MONTHS, YOU WORRIED THAT YOUR FOOD WOULD RUN OUT BEFORE YOU GOT THE MONEY TO BUY MORE: NEVER TRUE
HOW OFTEN DO YOU ATTENT MEETINGS OF THE CLUB OR ORGANIZATION YOU BELONG TO?: PATIENT DECLINED
DO YOU BELONG TO ANY CLUBS OR ORGANIZATIONS SUCH AS CHURCH GROUPS UNIONS, FRATERNAL OR ATHLETIC GROUPS, OR SCHOOL GROUPS?: NO
HOW OFTEN DO YOU HAVE SIX OR MORE DRINKS ON ONE OCCASION: NEVER
DO YOU BELONG TO ANY CLUBS OR ORGANIZATIONS SUCH AS CHURCH GROUPS UNIONS, FRATERNAL OR ATHLETIC GROUPS, OR SCHOOL GROUPS?: NO
HOW OFTEN DO YOU ATTENT MEETINGS OF THE CLUB OR ORGANIZATION YOU BELONG TO?: PATIENT DECLINED
HOW HARD IS IT FOR YOU TO PAY FOR THE VERY BASICS LIKE FOOD, HOUSING, MEDICAL CARE, AND HEATING?: NOT VERY HARD
HOW OFTEN DO YOU ATTEND CHURCH OR RELIGIOUS SERVICES?: MORE THAN 4 TIMES PER YEAR
HOW OFTEN DO YOU HAVE A DRINK CONTAINING ALCOHOL: MONTHLY OR LESS
IN A TYPICAL WEEK, HOW MANY TIMES DO YOU TALK ON THE PHONE WITH FAMILY, FRIENDS, OR NEIGHBORS?: MORE THAN THREE TIMES A WEEK
HOW OFTEN DO YOU GET TOGETHER WITH FRIENDS OR RELATIVES?: ONCE A WEEK
HOW MANY DRINKS CONTAINING ALCOHOL DO YOU HAVE ON A TYPICAL DAY WHEN YOU ARE DRINKING: 1 OR 2

## 2024-02-05 ASSESSMENT — LIFESTYLE VARIABLES
HOW MANY STANDARD DRINKS CONTAINING ALCOHOL DO YOU HAVE ON A TYPICAL DAY: 1 OR 2
AUDIT-C TOTAL SCORE: 1
HOW OFTEN DO YOU HAVE A DRINK CONTAINING ALCOHOL: MONTHLY OR LESS
HOW OFTEN DO YOU HAVE SIX OR MORE DRINKS ON ONE OCCASION: NEVER
SKIP TO QUESTIONS 9-10: 1

## 2024-02-06 ENCOUNTER — OFFICE VISIT (OUTPATIENT)
Dept: MEDICAL GROUP | Facility: PHYSICIAN GROUP | Age: 29
End: 2024-02-06
Payer: COMMERCIAL

## 2024-02-06 VITALS
OXYGEN SATURATION: 99 % | WEIGHT: 130 LBS | DIASTOLIC BLOOD PRESSURE: 60 MMHG | SYSTOLIC BLOOD PRESSURE: 100 MMHG | TEMPERATURE: 98.8 F | BODY MASS INDEX: 23.92 KG/M2 | HEIGHT: 62 IN | HEART RATE: 99 BPM

## 2024-02-06 DIAGNOSIS — Z86.32 HISTORY OF GESTATIONAL DIABETES: ICD-10-CM

## 2024-02-06 DIAGNOSIS — Z00.00 WELLNESS EXAMINATION: ICD-10-CM

## 2024-02-06 DIAGNOSIS — Z83.438 FAMILY HISTORY OF HYPERLIPIDEMIA: ICD-10-CM

## 2024-02-06 DIAGNOSIS — F41.1 GENERALIZED ANXIETY DISORDER: ICD-10-CM

## 2024-02-06 DIAGNOSIS — Z76.89 ENCOUNTER TO ESTABLISH CARE: ICD-10-CM

## 2024-02-06 DIAGNOSIS — D64.9 NORMOCYTIC ANEMIA: ICD-10-CM

## 2024-02-06 PROBLEM — F33.1 MODERATE EPISODE OF RECURRENT MAJOR DEPRESSIVE DISORDER (HCC): Status: RESOLVED | Noted: 2019-11-05 | Resolved: 2024-02-06

## 2024-02-06 PROCEDURE — 3078F DIAST BP <80 MM HG: CPT | Performed by: STUDENT IN AN ORGANIZED HEALTH CARE EDUCATION/TRAINING PROGRAM

## 2024-02-06 PROCEDURE — 3074F SYST BP LT 130 MM HG: CPT | Performed by: STUDENT IN AN ORGANIZED HEALTH CARE EDUCATION/TRAINING PROGRAM

## 2024-02-06 PROCEDURE — 99214 OFFICE O/P EST MOD 30 MIN: CPT | Performed by: STUDENT IN AN ORGANIZED HEALTH CARE EDUCATION/TRAINING PROGRAM

## 2024-02-06 RX ORDER — HYDROXYZINE HYDROCHLORIDE 25 MG/1
25 TABLET, FILM COATED ORAL 3 TIMES DAILY PRN
Qty: 90 TABLET | Refills: 1 | Status: SHIPPED | OUTPATIENT
Start: 2024-02-06

## 2024-02-06 ASSESSMENT — ENCOUNTER SYMPTOMS
SHORTNESS OF BREATH: 0
PALPITATIONS: 0
FEVER: 0
CHILLS: 0

## 2024-02-06 ASSESSMENT — PATIENT HEALTH QUESTIONNAIRE - PHQ9: CLINICAL INTERPRETATION OF PHQ2 SCORE: 0

## 2024-02-06 NOTE — PROGRESS NOTES
"Subjective:     CC:  Diagnoses of Episodic tension-type headache, not intractable, Generalized anxiety disorder, History of gestational diabetes, Family history of hyperlipidemia, Wellness examination, and Normocytic anemia were pertinent to this visit.    HISTORY OF THE PRESENT ILLNESS: Patient is a 28 y.o. female. This pleasant patient is here today to establish care.    Patient presents today to establish care.  Patient reports that she has anxiety and is currently following up with a therapist.  She would like to have medication that she only takes when needed.  She is not interested in daily medication for her anxiety.    Patient is also requesting to have labs completed.    Patient Active Problem List    Diagnosis Date Noted    Generalized anxiety disorder 02/06/2024    Nonintractable episodic headache 11/05/2019     Health Maintenance: Completed    ROS:   Review of Systems   Constitutional:  Negative for chills and fever.   Respiratory:  Negative for shortness of breath.    Cardiovascular:  Negative for chest pain and palpitations.         Objective:     Exam: /60 (BP Location: Left arm, Patient Position: Sitting, BP Cuff Size: Adult)   Pulse 99   Temp 37.1 °C (98.8 °F) (Temporal)   Ht 1.575 m (5' 2\")   Wt 59 kg (130 lb)   SpO2 99%  Body mass index is 23.78 kg/m².    Physical Exam  Constitutional:       Appearance: Normal appearance.   Cardiovascular:      Rate and Rhythm: Normal rate and regular rhythm.      Heart sounds: Normal heart sounds.   Pulmonary:      Effort: Pulmonary effort is normal. No respiratory distress.      Breath sounds: Normal breath sounds. No stridor. No wheezing, rhonchi or rales.   Neurological:      Mental Status: She is alert.             Assessment & Plan:   28 y.o. female with the following -    1. Encounter to establish care  2. Wellness examination  Patient presents today to establish care. Chart was reviewed and history was discussed in detail with the patient.  At " the next visit patient to provide vaccination records.  Annual lab orders provided today.  Pap through gynecology.  - Comp Metabolic Panel; Future  - Lipid Profile; Future  - CBC WITH DIFFERENTIAL; Future    3. Generalized anxiety disorder  Chronic, stable condition.  Patient is currently established with a therapist.  After shared decision making we will start patient on hydroxyzine 25 mg as needed for anxiety.  - hydrOXYzine HCl (ATARAX) 25 MG Tab; Take 1 Tablet by mouth 3 times a day as needed for Itching or Anxiety.  Dispense: 90 Tablet; Refill: 1    4. History of gestational diabetes  Patient reports she has a history of gestational diabetes and she did not have post pregnancy A1c check.  Will order A1c today.  - HEMOGLOBIN A1C; Future    5. Normocytic anemia  Chronic, stable condition.  Last CBC completed showed normocytic anemia.  Will order CBC.  - CBC WITH DIFFERENTIAL; Future    6. Family history of hyperlipidemia  - Lipid Profile; Future    Return in about 6 months (around 8/6/2024) for Annual.    Please note that this dictation was created using voice recognition software. I have made every reasonable attempt to correct obvious errors, but I expect that there are errors of grammar and possibly content that I did not discover before finalizing the note.

## 2024-02-08 ENCOUNTER — HOSPITAL ENCOUNTER (OUTPATIENT)
Facility: MEDICAL CENTER | Age: 29
End: 2024-02-08
Attending: STUDENT IN AN ORGANIZED HEALTH CARE EDUCATION/TRAINING PROGRAM
Payer: COMMERCIAL

## 2024-02-08 DIAGNOSIS — Z00.00 WELLNESS EXAMINATION: ICD-10-CM

## 2024-02-08 DIAGNOSIS — Z83.438 FAMILY HISTORY OF HYPERLIPIDEMIA: ICD-10-CM

## 2024-02-08 DIAGNOSIS — Z86.32 HISTORY OF GESTATIONAL DIABETES: ICD-10-CM

## 2024-02-08 DIAGNOSIS — D64.9 NORMOCYTIC ANEMIA: ICD-10-CM

## 2024-02-08 LAB
ALBUMIN SERPL BCP-MCNC: 4.6 G/DL (ref 3.2–4.9)
ALBUMIN/GLOB SERPL: 1.5 G/DL
ALP SERPL-CCNC: 67 U/L (ref 30–99)
ALT SERPL-CCNC: 12 U/L (ref 2–50)
ANION GAP SERPL CALC-SCNC: 13 MMOL/L (ref 7–16)
AST SERPL-CCNC: 15 U/L (ref 12–45)
BASOPHILS # BLD AUTO: 0.5 % (ref 0–1.8)
BASOPHILS # BLD: 0.03 K/UL (ref 0–0.12)
BILIRUB SERPL-MCNC: 1 MG/DL (ref 0.1–1.5)
BUN SERPL-MCNC: 19 MG/DL (ref 8–22)
CALCIUM ALBUM COR SERPL-MCNC: 9.2 MG/DL (ref 8.5–10.5)
CALCIUM SERPL-MCNC: 9.7 MG/DL (ref 8.5–10.5)
CHLORIDE SERPL-SCNC: 106 MMOL/L (ref 96–112)
CHOLEST SERPL-MCNC: 174 MG/DL (ref 100–199)
CO2 SERPL-SCNC: 24 MMOL/L (ref 20–33)
CREAT SERPL-MCNC: 0.91 MG/DL (ref 0.5–1.4)
EOSINOPHIL # BLD AUTO: 0.08 K/UL (ref 0–0.51)
EOSINOPHIL NFR BLD: 1.5 % (ref 0–6.9)
ERYTHROCYTE [DISTWIDTH] IN BLOOD BY AUTOMATED COUNT: 41.5 FL (ref 35.9–50)
EST. AVERAGE GLUCOSE BLD GHB EST-MCNC: 85 MG/DL
FASTING STATUS PATIENT QL REPORTED: NORMAL
GFR SERPLBLD CREATININE-BSD FMLA CKD-EPI: 88 ML/MIN/1.73 M 2
GLOBULIN SER CALC-MCNC: 3.1 G/DL (ref 1.9–3.5)
GLUCOSE SERPL-MCNC: 104 MG/DL (ref 65–99)
HBA1C MFR BLD: 4.6 % (ref 4–5.6)
HCT VFR BLD AUTO: 42.4 % (ref 37–47)
HDLC SERPL-MCNC: 56 MG/DL
HGB BLD-MCNC: 14.1 G/DL (ref 12–16)
IMM GRANULOCYTES # BLD AUTO: 0.01 K/UL (ref 0–0.11)
IMM GRANULOCYTES NFR BLD AUTO: 0.2 % (ref 0–0.9)
LDLC SERPL CALC-MCNC: 107 MG/DL
LYMPHOCYTES # BLD AUTO: 2.32 K/UL (ref 1–4.8)
LYMPHOCYTES NFR BLD: 42.1 % (ref 22–41)
MCH RBC QN AUTO: 30.9 PG (ref 27–33)
MCHC RBC AUTO-ENTMCNC: 33.3 G/DL (ref 32.2–35.5)
MCV RBC AUTO: 92.8 FL (ref 81.4–97.8)
MONOCYTES # BLD AUTO: 0.34 K/UL (ref 0–0.85)
MONOCYTES NFR BLD AUTO: 6.2 % (ref 0–13.4)
NEUTROPHILS # BLD AUTO: 2.73 K/UL (ref 1.82–7.42)
NEUTROPHILS NFR BLD: 49.5 % (ref 44–72)
NRBC # BLD AUTO: 0 K/UL
NRBC BLD-RTO: 0 /100 WBC (ref 0–0.2)
PLATELET # BLD AUTO: 259 K/UL (ref 164–446)
PMV BLD AUTO: 10.9 FL (ref 9–12.9)
POTASSIUM SERPL-SCNC: 4.4 MMOL/L (ref 3.6–5.5)
PROT SERPL-MCNC: 7.7 G/DL (ref 6–8.2)
RBC # BLD AUTO: 4.57 M/UL (ref 4.2–5.4)
SODIUM SERPL-SCNC: 143 MMOL/L (ref 135–145)
TRIGL SERPL-MCNC: 53 MG/DL (ref 0–149)
WBC # BLD AUTO: 5.5 K/UL (ref 4.8–10.8)

## 2024-02-08 PROCEDURE — 83036 HEMOGLOBIN GLYCOSYLATED A1C: CPT

## 2024-02-08 PROCEDURE — 85025 COMPLETE CBC W/AUTO DIFF WBC: CPT

## 2024-02-08 PROCEDURE — 80053 COMPREHEN METABOLIC PANEL: CPT

## 2024-02-08 PROCEDURE — 80061 LIPID PANEL: CPT

## 2024-02-14 ENCOUNTER — OFFICE VISIT (OUTPATIENT)
Dept: BEHAVIORAL HEALTH | Facility: CLINIC | Age: 29
End: 2024-02-14
Payer: COMMERCIAL

## 2024-02-14 DIAGNOSIS — F41.1 GAD (GENERALIZED ANXIETY DISORDER): ICD-10-CM

## 2024-02-14 PROCEDURE — 90834 PSYTX W PT 45 MINUTES: CPT | Mod: MISDOCU

## 2024-02-14 NOTE — PROGRESS NOTES
[facility] Progress Note      Therapy was provided on this date in coordination with the [facility] approved Clinical Supervisor under the direct supervision of  who was on site during this visit.    Therapist reviewed informed consent, limits of confidentiality and [facility] policies; patient expressed understanding and agreed to voluntarily proceed with evaluation and treatment.    Name: Jenna Moyer  MRN: [id number]  : 1995  Age: 28 y.o.  Date of assessment: 2024  PCP: Sally Arvizu M.D.  Persons in attendance: Patient and LCSWi Tita Lovelace  Total session time: 50 minutes      Topics addressed in psychotherapy include:     Data: Client appeared alert and oriented. Client reports she has reconciled with . Client reports feelings content with his changes. Client reports wanting to work on herself.     Assessment: Client and therapist discussed thought process of reconciliation. Client and therapist discussed family systems theory. Client and therapist discussed anxiety and environmental factors of it, client was provided psychotherapy on trauma and the body. Client and therapist set mutual goals: coping with anxiety, emotional regulation and united front parenting.     Plan:Therapist provided supportive psychotherapy. Plan of care is to utilize CBT.     Objective Observations:   Participation:Active verbal participation and Open to feedback   Grooming:Casual   Cognition:Alert and Fully Oriented   Eye Contact:Good   Mood:Happy   Affect:Full range   Thought Process:Logical   Speech:Rate within normal limits and Volume within normal limits    Current Risk:   Suicide:  Not indicated   Homicide:  Not indicated   Self-Harm:  Not indicated    Relapse:  Not indicated    Safety Plan Reviewed: not applicable        Diagnosis:  1. NIRU (generalized anxiety disorder)            Tita Lovelace, NIXON, CSW-Intern

## 2024-02-27 ENCOUNTER — HOSPITAL ENCOUNTER (OUTPATIENT)
Dept: LAB | Facility: MEDICAL CENTER | Age: 29
End: 2024-02-27
Attending: PHYSICIAN ASSISTANT
Payer: COMMERCIAL

## 2024-02-27 PROCEDURE — 88175 CYTOPATH C/V AUTO FLUID REDO: CPT

## 2024-03-04 LAB
COMMENT NL11729A: NORMAL
CYTOLOGIST CVX/VAG CYTO: NORMAL
CYTOLOGY CVX/VAG DOC CYTO: NORMAL
CYTOLOGY CVX/VAG DOC THIN PREP: NORMAL
NOTE NL11727A: NORMAL
OTHER STN SPEC: NORMAL
STAT OF ADQ CVX/VAG CYTO-IMP: NORMAL

## 2024-03-18 ENCOUNTER — OFFICE VISIT (OUTPATIENT)
Dept: BEHAVIORAL HEALTH | Facility: CLINIC | Age: 29
End: 2024-03-18
Payer: COMMERCIAL

## 2024-03-18 DIAGNOSIS — F41.1 GAD (GENERALIZED ANXIETY DISORDER): ICD-10-CM

## 2024-03-18 PROCEDURE — 99999 PR NO CHARGE: CPT

## 2024-03-18 NOTE — PROGRESS NOTES
"[facility]   Therapy Progress Note      Therapy was provided on this date in coordination with the [facility] approved Clinical Supervisor under the direct supervision of  who was on site during this visit.    Therapist reviewed informed consent, limits of confidentiality and [facility] policies; patient expressed understanding and agreed to voluntarily proceed with evaluation and treatment.    Name: Jenna Moyer  MRN: [id number]  : 1995  Age: 28 y.o.  Date of assessment: 3/18/2024  PCP: Sally Arvizu M.D.  Persons in attendance: Patient and LCSWi Tita Lovelace  Total session time: 50 minutes      Topics addressed in psychotherapy include:     Data: Client appeared alert and oriented. Client reports marriage reconciliation is going well \"we are enjoying each other and laughing again.\" Client reports working on parenting and showing up as a united front. Client reports \"I am trying to work on my logic and Vitaly's emotions and figuring out where to bend.\"    Assessment: Client and therapist discussed reconciliation in depth, client works heavily on logic and reasoning whereas spouse tends to act more on emotion. Client to work on deciding where compromise can happen I.e. sports. Client and therapist discussed parenting, client was provided psychoeducation on authoritarian, authoritative, and passive/neglectful parenting styles. Client to engage with spouse on research of parenting types and decide which type they most identify with. This writer to provide educational resources once decision made. Client and therapist discussed emotional intelligence and putting that into practice. Client responded well to support and validation as well as psychoeducation.     Plan:Therapist provided supportive psychotherapy. Plan of care is to utilize CBT.         Objective Observations:   Participation:Active verbal participation, Engaged, and Open to " feedback   Grooming:Neat   Cognition:Alert and Fully Oriented   Eye Contact:Good   Mood:Euthymic   Affect:Congruent with content   Thought Process:Logical and Goal-directed   Speech:Rate within normal limits and Volume within normal limits    Current Risk:   Suicide:  Not indicated   Homicide:  Not indicated   Self-Harm:  Not indicated    Relapse:  Not indicated    Safety Plan Reviewed: not applicable        Diagnosis:  1. NIRU (generalized anxiety disorder)            Tita Lovelace, NIXON, CSW-Intern

## 2024-04-12 NOTE — PROGRESS NOTES
[facility] Therapy Progress Note      Therapy was provided on this date in coordination with the [facility] approved Clinical Supervisor under the direct supervision of  who was on site during this visit.    Therapist reviewed informed consent, limits of confidentiality and [facility] policies; patient expressed understanding and agreed to voluntarily proceed with evaluation and treatment.    Name: Jenna Moyer  MRN: [id number]  : 1995  Age: 28 y.o.  Date of assessment: 4/15/2024  PCP: Sally Arvizu M.D.  Persons in attendance: Patient and LCSJodee Lovelace  Total session time: 50 minutes      Topics addressed in psychotherapy include:     Data: Client appeared alert and oriented. Client reports marriage is going very well. Client reports better communication. Client reports still having some outbursts but working on mood charting and recognizing signs before outburst.     Assessment: Client and therapist discussed changes in marriage, not enabling addict and how prioritization of self ended in a best case scenario. Client and therapist explored moods and outbursts, client worked on listing out signs she may be headed for breakdown. Therapist guided client through mindfulness exercises and setting goals for self when it comes to regulation. Client to work on prioritizing alone time for transition from work to home. Client responded well to support and validation.     Plan:Therapist provided supportive psychotherapy. Plan of care is to utilize CBT. Client agreeable to move to maintenance schedule of 1/6weeks.         Objective Observations:   Participation:Active verbal participation, Attentive, Engaged, and Open to feedback   Grooming:Casual   Cognition:Alert and Fully Oriented   Eye Contact:Good   Mood:Euthymic   Affect:Congruent with content   Thought Process:Logical   Speech:Rate within normal limits and Volume within normal limits    Current  Risk:   Suicide:  Not indicated   Homicide:  Not indicated   Self-Harm:  Not indicated    Relapse:  Not indicated    Safety Plan Reviewed: not applicable        Diagnosis:  1. NIRU (generalized anxiety disorder)            Tita Lovelace LMSW, CSW-Intern

## 2024-04-15 ENCOUNTER — OFFICE VISIT (OUTPATIENT)
Dept: BEHAVIORAL HEALTH | Facility: CLINIC | Age: 29
End: 2024-04-15
Payer: COMMERCIAL

## 2024-04-15 DIAGNOSIS — F41.1 GAD (GENERALIZED ANXIETY DISORDER): ICD-10-CM

## 2024-04-15 PROCEDURE — 99999 PR NO CHARGE: CPT

## 2024-05-10 NOTE — PROGRESS NOTES
[facility]   Therapy Progress Note      Therapy was provided on this date in coordination with the [facility] approved Clinical Supervisor under the direct supervision of  who was on site during this visit.    Therapist reviewed informed consent, limits of confidentiality and [facility] policies; patient expressed understanding and agreed to voluntarily proceed with evaluation and treatment.    Name: Jenna Moyer  MRN: [id number]  : 1995  Age: 28 y.o.  Date of assessment: 2024  PCP: Sally Arvizu M.D.  Persons in attendance: Patient and LCSJodee Lovelace  Total session time: 50 minutes      Topics addressed in psychotherapy include:     Data: This section includes observable data, like screenings and patient statements.    Assessment: Here, you'll interpret the data with your clinical knowledge, identifying symptoms and potential diagnoses.    Plan: Therapist provided supportive psychotherapy. Therapist discussed with client completion of therapy, strategies for maintaining progress independently and plans for ongoing support and resources post-therapy. Plan of care is to transfer care to a new provider upon this writer's resignation.     Objective Observations:   Participation:{[facility] PARTICIPATION MEASURES:[id number]}   Grooming:{[facility]}   Cognition:{[facility] ORIENTATION:97449048}   Eye Contact:{[facility] EYE CONTACT:96783753}   Mood:{[facility] MOOD:33138824}   Affect:{[facility] AFFECT:22172397}   Thought Process:{[facility] THOUGHT PROCESS:66224749}   Speech:{[facility] SPEECH:70131353}    Current Risk:   Suicide:  Not indicated   Homicide:  Not indicated   Self-Harm:  Not indicated    Relapse:  Not indicated    Safety Plan Reviewed: not applicable        Diagnosis:  1. NIRU (generalized anxiety disorder)            Tita Lovelace, NIXON, CSW-Intern

## 2024-05-13 ENCOUNTER — APPOINTMENT (OUTPATIENT)
Dept: BEHAVIORAL HEALTH | Facility: CLINIC | Age: 29
End: 2024-05-13
Payer: COMMERCIAL

## 2024-05-13 DIAGNOSIS — F41.1 GAD (GENERALIZED ANXIETY DISORDER): ICD-10-CM

## 2025-05-07 ENCOUNTER — OFFICE VISIT (OUTPATIENT)
Dept: URGENT CARE | Facility: PHYSICIAN GROUP | Age: 30
End: 2025-05-07
Payer: COMMERCIAL

## 2025-05-07 VITALS
BODY MASS INDEX: 23.33 KG/M2 | HEART RATE: 72 BPM | RESPIRATION RATE: 16 BRPM | HEIGHT: 62 IN | DIASTOLIC BLOOD PRESSURE: 68 MMHG | TEMPERATURE: 97.3 F | OXYGEN SATURATION: 99 % | WEIGHT: 126.76 LBS | SYSTOLIC BLOOD PRESSURE: 106 MMHG

## 2025-05-07 DIAGNOSIS — J01.90 ACUTE BACTERIAL RHINOSINUSITIS: ICD-10-CM

## 2025-05-07 DIAGNOSIS — J35.8 TONSILLAR EXUDATE: ICD-10-CM

## 2025-05-07 DIAGNOSIS — B96.89 ACUTE BACTERIAL RHINOSINUSITIS: ICD-10-CM

## 2025-05-07 PROCEDURE — 3074F SYST BP LT 130 MM HG: CPT | Performed by: NURSE PRACTITIONER

## 2025-05-07 PROCEDURE — 3078F DIAST BP <80 MM HG: CPT | Performed by: NURSE PRACTITIONER

## 2025-05-07 PROCEDURE — 99214 OFFICE O/P EST MOD 30 MIN: CPT | Performed by: NURSE PRACTITIONER

## 2025-05-07 ASSESSMENT — FIBROSIS 4 INDEX: FIB4 SCORE: 0.48

## 2025-05-07 NOTE — PROGRESS NOTES
Jenna Moyer is a 29 y.o. female who presents for Otalgia (Sx started on Thursday with ringing in the right ear, right ear pain and pressure, sore throat, productive cough, sinus pressure, post nasal drip, no taste or smell )      HPI  This is a new problem. Jenna Moyer is a 29 y.o. patient who presents to urgent care with c/o: A week of sore throat, ringing in her ear, right ear pain is worse than the left.  Sore throat, productive cough, sinus pressure, postnasal drip and change in taste and smell.  Denies fever, chills.  History of sinus infections and this feels the same.  Treatments have not been effective alleviated her symptoms.  Tx tried: dayquil, mucinex, ibu, tylenol.   Denies fever, dizziness, eye drainage, SOB    ROS See HPI    Allergies:     No Known Allergies    PMSFS Hx:  Past Medical History:   Diagnosis Date    Anxiety     Diabetes (HCC)     Moderate episode of recurrent major depressive disorder (HCC) 11/05/2019     Past Surgical History:   Procedure Laterality Date    OTHER      wisdom teeth     Family History   Problem Relation Age of Onset    No Known Problems Mother     Diabetes Father     Hypertension Father     Migraines Sister     Cancer Maternal Uncle         Melanoma    Cancer Paternal Aunt         Brain Cancer    Bipolar disorder Paternal Aunt     Arterial Aneurysm Maternal Grandmother 45        brain    Cancer Paternal Grandfather         lung     Social History     Tobacco Use    Smoking status: Never    Smokeless tobacco: Never   Substance Use Topics    Alcohol use: Not Currently     Alcohol/week: 0.6 oz     Types: 1 Shots of liquor per week     Comment: social         Problems:   Patient Active Problem List   Diagnosis    Nonintractable episodic headache    Generalized anxiety disorder       Medications:   Current Outpatient Medications on File Prior to Visit   Medication Sig Dispense Refill    hydrOXYzine HCl (ATARAX) 25 MG Tab Take 1 Tablet by mouth 3 times a day as  "needed for Itching or Anxiety. 90 Tablet 1     No current facility-administered medications on file prior to visit.        Objective:     /68   Pulse 72   Temp 36.3 °C (97.3 °F) (Temporal)   Resp 16   Ht 1.575 m (5' 2\")   Wt 57.5 kg (126 lb 12.2 oz)   SpO2 99%   Breastfeeding No   BMI 23.19 kg/m²     Physical Exam  Vitals and nursing note reviewed.   Constitutional:       General: She is not in acute distress.     Appearance: Normal appearance. She is well-developed. She is not toxic-appearing.   HENT:      Right Ear: Hearing, ear canal and external ear normal. Tympanic membrane is bulging.      Left Ear: Hearing, ear canal and external ear normal. Tympanic membrane is injected and bulging.      Nose: Mucosal edema, congestion and rhinorrhea present. Rhinorrhea is purulent.      Right Sinus: No frontal sinus tenderness.      Left Sinus: No frontal sinus tenderness.      Mouth/Throat:      Lips: Pink.      Mouth: Mucous membranes are moist.      Pharynx: Uvula midline. Oropharyngeal exudate (Left tonsil) and posterior oropharyngeal erythema present.      Tonsils: No tonsillar abscesses.   Eyes:      General: Lids are normal.      Conjunctiva/sclera: Conjunctivae normal.   Neck:      Trachea: Trachea and phonation normal.   Cardiovascular:      Rate and Rhythm: Normal rate and regular rhythm.      Pulses: Normal pulses.      Heart sounds: Normal heart sounds.   Pulmonary:      Effort: Pulmonary effort is normal. No respiratory distress.      Breath sounds: Normal breath sounds.   Musculoskeletal:      Cervical back: Full passive range of motion without pain and neck supple.   Lymphadenopathy:      Head:      Right side of head: No tonsillar adenopathy.      Left side of head: No tonsillar adenopathy.      Cervical: No cervical adenopathy.      Upper Body:      Right upper body: No supraclavicular adenopathy.      Left upper body: No supraclavicular adenopathy.   Skin:     General: Skin is warm and dry. "      Capillary Refill: Capillary refill takes less than 2 seconds.   Neurological:      Mental Status: She is alert and oriented to person, place, and time.      Deep Tendon Reflexes: Reflexes are normal and symmetric.   Psychiatric:         Mood and Affect: Mood normal.         Speech: Speech normal.         Behavior: Behavior normal.         Assessment /Associated Orders:      1. Acute bacterial rhinosinusitis  amoxicillin-clavulanate (AUGMENTIN) 875-125 MG Tab      2. Tonsillar exudate  amoxicillin-clavulanate (AUGMENTIN) 875-125 MG Tab            Medical Decision Making:    Jenna Moyer is a very pleasant 29 y.o. female who is clinically stable at today's acute urgent care visit. Presents with acute problem/ concern today.    No acute distress is noted at the time of the visit.  VSS. Appropriate for outpatient care at this time.     Educated in proper administration of  prescription medication(s) ordered today including safety, possible SE, risks, benefits, rationale and alternatives to therapy.   OTC  analgesic of choice (acetaminophen or NSAID) prn pain. Follow manufactures dosing and safety precautions.     OTC non-drowsy antihistamine of choice. Follow manufactures dosing and safety guidelines.   OTC nasal steroid spray. Dosage and directions per . Use daily for 10-14 days.    Cool mist humidifier at night prn     Through shared decision making a discussion of the Dx and DDx, management options (risks,benefits, and alternatives to planned treatment), natural progression, supportive care and indications for immediate follow-up discussed. Expressed understanding and the treatment plan was agreed upon.    Questions were encouraged and answered     Follow Up:   Return to urgent care prn if new or worsening sx or if there is no improvement in condition prn.    Educated in Red flags and indications to immediately call 911 or present to the Emergency Department.       Time I spent evaluating Jenna  Nichole Moyer in urgent care today was 30  minutes. This time includes preparing for visit, reviewing any pertinent notes or test results, exam, obtaining HPI, interpretation of lab tests,counseling/education, medication management ( RX and/ or OTC)  and documentation as indicated above.Time does not include separately billable procedures if noted .       Please note that this dictation was created using voice recognition software. I have worked with consultants from the vendor as well as technical experts from [facility] to optimize the interface. I have made every reasonable attempt to correct obvious errors, but I expect that there are errors of grammar and possibly content that I did not discover before finalizing the note.  This note was electronically signed by provider